# Patient Record
Sex: MALE | Race: WHITE | Employment: OTHER | URBAN - METROPOLITAN AREA
[De-identification: names, ages, dates, MRNs, and addresses within clinical notes are randomized per-mention and may not be internally consistent; named-entity substitution may affect disease eponyms.]

---

## 2021-02-02 ENCOUNTER — HOSPITAL ENCOUNTER (EMERGENCY)
Age: 71
Discharge: ANOTHER HOSPITAL | DRG: 871 | End: 2021-02-02
Attending: EMERGENCY MEDICINE
Payer: COMMERCIAL

## 2021-02-02 ENCOUNTER — HOSPITAL ENCOUNTER (INPATIENT)
Age: 71
LOS: 6 days | Discharge: NURSING, REHAB OR LONG-TERM CARE | DRG: 871 | End: 2021-02-08
Attending: INTERNAL MEDICINE | Admitting: INTERNAL MEDICINE
Payer: COMMERCIAL

## 2021-02-02 ENCOUNTER — APPOINTMENT (OUTPATIENT)
Dept: GENERAL RADIOLOGY | Age: 71
DRG: 871 | End: 2021-02-02
Attending: EMERGENCY MEDICINE
Payer: COMMERCIAL

## 2021-02-02 VITALS
BODY MASS INDEX: 27.09 KG/M2 | SYSTOLIC BLOOD PRESSURE: 150 MMHG | TEMPERATURE: 104.7 F | HEIGHT: 72 IN | HEART RATE: 118 BPM | WEIGHT: 200 LBS | OXYGEN SATURATION: 95 % | RESPIRATION RATE: 20 BRPM | DIASTOLIC BLOOD PRESSURE: 90 MMHG

## 2021-02-02 DIAGNOSIS — J96.01 ACUTE RESPIRATORY FAILURE WITH HYPOXIA (HCC): Primary | ICD-10-CM

## 2021-02-02 DIAGNOSIS — J12.82 PNEUMONIA DUE TO COVID-19 VIRUS: ICD-10-CM

## 2021-02-02 DIAGNOSIS — U07.1 PNEUMONIA DUE TO COVID-19 VIRUS: ICD-10-CM

## 2021-02-02 PROBLEM — E87.20 LACTIC ACIDOSIS: Status: ACTIVE | Noted: 2021-02-02

## 2021-02-02 PROBLEM — A41.9 SEVERE SEPSIS WITH ACUTE ORGAN DYSFUNCTION (HCC): Status: ACTIVE | Noted: 2021-02-02

## 2021-02-02 PROBLEM — J18.9 MULTIFOCAL PNEUMONIA: Status: ACTIVE | Noted: 2021-02-02

## 2021-02-02 PROBLEM — R65.20 SEVERE SEPSIS WITH ACUTE ORGAN DYSFUNCTION (HCC): Status: ACTIVE | Noted: 2021-02-02

## 2021-02-02 LAB
ALBUMIN SERPL-MCNC: 3.6 G/DL (ref 3.2–4.6)
ALBUMIN/GLOB SERPL: 0.8 {RATIO} (ref 1.2–3.5)
ALP SERPL-CCNC: 87 U/L (ref 50–136)
ALT SERPL-CCNC: 72 U/L (ref 12–65)
ANION GAP SERPL CALC-SCNC: 8 MMOL/L (ref 7–16)
AST SERPL-CCNC: 112 U/L (ref 15–37)
BASOPHILS # BLD: 0 K/UL (ref 0–0.2)
BASOPHILS NFR BLD: 0 % (ref 0–2)
BILIRUB SERPL-MCNC: 0.8 MG/DL (ref 0.2–1.1)
BUN SERPL-MCNC: 24 MG/DL (ref 8–23)
CALCIUM SERPL-MCNC: 8.2 MG/DL (ref 8.3–10.4)
CHLORIDE SERPL-SCNC: 108 MMOL/L (ref 98–107)
CO2 SERPL-SCNC: 27 MMOL/L (ref 21–32)
COVID-19 RAPID TEST, COVR: DETECTED
CREAT SERPL-MCNC: 1.49 MG/DL (ref 0.8–1.5)
DIFFERENTIAL METHOD BLD: ABNORMAL
EOSINOPHIL # BLD: 0 K/UL (ref 0–0.8)
EOSINOPHIL NFR BLD: 0 % (ref 0.5–7.8)
ERYTHROCYTE [DISTWIDTH] IN BLOOD BY AUTOMATED COUNT: 12.6 % (ref 11.9–14.6)
GLOBULIN SER CALC-MCNC: 4.4 G/DL (ref 2.3–3.5)
GLUCOSE SERPL-MCNC: 111 MG/DL (ref 65–100)
HCT VFR BLD AUTO: 47.3 % (ref 41.1–50.3)
HGB BLD-MCNC: 15.2 G/DL (ref 13.6–17.2)
IMM GRANULOCYTES # BLD AUTO: 0 K/UL (ref 0–0.5)
IMM GRANULOCYTES NFR BLD AUTO: 1 % (ref 0–5)
LACTATE SERPL-SCNC: 2.6 MMOL/L (ref 0.4–2)
LYMPHOCYTES # BLD: 0.6 K/UL (ref 0.5–4.6)
LYMPHOCYTES NFR BLD: 9 % (ref 13–44)
MCH RBC QN AUTO: 28.8 PG (ref 26.1–32.9)
MCHC RBC AUTO-ENTMCNC: 32.1 G/DL (ref 31.4–35)
MCV RBC AUTO: 89.8 FL (ref 79.6–97.8)
MONOCYTES # BLD: 0.5 K/UL (ref 0.1–1.3)
MONOCYTES NFR BLD: 8 % (ref 4–12)
NEUTS SEG # BLD: 5.3 K/UL (ref 1.7–8.2)
NEUTS SEG NFR BLD: 83 % (ref 43–78)
NRBC # BLD: 0 K/UL (ref 0–0.2)
PLATELET # BLD AUTO: 165 K/UL (ref 150–450)
PMV BLD AUTO: 10.4 FL (ref 9.4–12.3)
POTASSIUM SERPL-SCNC: 3.8 MMOL/L (ref 3.5–5.1)
PROT SERPL-MCNC: 8 G/DL (ref 6.3–8.2)
RBC # BLD AUTO: 5.27 M/UL (ref 4.23–5.6)
SARS-COV-2, COV2: NORMAL
SODIUM SERPL-SCNC: 143 MMOL/L (ref 136–145)
SOURCE, COVRS: ABNORMAL
WBC # BLD AUTO: 6.5 K/UL (ref 4.3–11.1)

## 2021-02-02 PROCEDURE — 74011000258 HC RX REV CODE- 258: Performed by: EMERGENCY MEDICINE

## 2021-02-02 PROCEDURE — 65270000029 HC RM PRIVATE

## 2021-02-02 PROCEDURE — 80053 COMPREHEN METABOLIC PANEL: CPT

## 2021-02-02 PROCEDURE — 74011250636 HC RX REV CODE- 250/636: Performed by: EMERGENCY MEDICINE

## 2021-02-02 PROCEDURE — 96375 TX/PRO/DX INJ NEW DRUG ADDON: CPT

## 2021-02-02 PROCEDURE — 87635 SARS-COV-2 COVID-19 AMP PRB: CPT

## 2021-02-02 PROCEDURE — 96361 HYDRATE IV INFUSION ADD-ON: CPT

## 2021-02-02 PROCEDURE — 87040 BLOOD CULTURE FOR BACTERIA: CPT

## 2021-02-02 PROCEDURE — 71045 X-RAY EXAM CHEST 1 VIEW: CPT

## 2021-02-02 PROCEDURE — 74011250637 HC RX REV CODE- 250/637: Performed by: EMERGENCY MEDICINE

## 2021-02-02 PROCEDURE — 96365 THER/PROPH/DIAG IV INF INIT: CPT

## 2021-02-02 PROCEDURE — 83605 ASSAY OF LACTIC ACID: CPT

## 2021-02-02 PROCEDURE — 99285 EMERGENCY DEPT VISIT HI MDM: CPT

## 2021-02-02 PROCEDURE — 85025 COMPLETE CBC W/AUTO DIFF WBC: CPT

## 2021-02-02 RX ORDER — POLYETHYLENE GLYCOL 3350 17 G/17G
17 POWDER, FOR SOLUTION ORAL DAILY PRN
Status: DISCONTINUED | OUTPATIENT
Start: 2021-02-02 | End: 2021-02-08 | Stop reason: HOSPADM

## 2021-02-02 RX ORDER — ASCORBIC ACID 500 MG
1000 TABLET ORAL 2 TIMES DAILY
Status: DISCONTINUED | OUTPATIENT
Start: 2021-02-03 | End: 2021-02-08 | Stop reason: HOSPADM

## 2021-02-02 RX ORDER — GUAIFENESIN 600 MG/1
600 TABLET, EXTENDED RELEASE ORAL EVERY 12 HOURS
Status: DISCONTINUED | OUTPATIENT
Start: 2021-02-03 | End: 2021-02-06 | Stop reason: ALTCHOICE

## 2021-02-02 RX ORDER — PROMETHAZINE HYDROCHLORIDE 25 MG/1
12.5 TABLET ORAL
Status: DISCONTINUED | OUTPATIENT
Start: 2021-02-02 | End: 2021-02-08 | Stop reason: HOSPADM

## 2021-02-02 RX ORDER — ACETAMINOPHEN 650 MG/1
650 SUPPOSITORY RECTAL
Status: COMPLETED | OUTPATIENT
Start: 2021-02-02 | End: 2021-02-02

## 2021-02-02 RX ORDER — ACETAMINOPHEN 500 MG
1000 TABLET ORAL
Status: COMPLETED | OUTPATIENT
Start: 2021-02-02 | End: 2021-02-02

## 2021-02-02 RX ORDER — ALBUTEROL SULFATE 90 UG/1
1 AEROSOL, METERED RESPIRATORY (INHALATION)
Status: DISCONTINUED | OUTPATIENT
Start: 2021-02-02 | End: 2021-02-08 | Stop reason: HOSPADM

## 2021-02-02 RX ORDER — ENOXAPARIN SODIUM 100 MG/ML
40 INJECTION SUBCUTANEOUS DAILY
Status: DISCONTINUED | OUTPATIENT
Start: 2021-02-03 | End: 2021-02-03

## 2021-02-02 RX ORDER — LISINOPRIL 20 MG/1
20 TABLET ORAL EVERY MORNING
COMMUNITY
End: 2021-02-08

## 2021-02-02 RX ORDER — SODIUM CHLORIDE 0.9 % (FLUSH) 0.9 %
5-40 SYRINGE (ML) INJECTION EVERY 8 HOURS
Status: DISCONTINUED | OUTPATIENT
Start: 2021-02-03 | End: 2021-02-08 | Stop reason: HOSPADM

## 2021-02-02 RX ORDER — SODIUM CHLORIDE 0.9 % (FLUSH) 0.9 %
5-40 SYRINGE (ML) INJECTION AS NEEDED
Status: DISCONTINUED | OUTPATIENT
Start: 2021-02-02 | End: 2021-02-08 | Stop reason: HOSPADM

## 2021-02-02 RX ORDER — AMLODIPINE BESYLATE 10 MG/1
10 TABLET ORAL DAILY
COMMUNITY
Start: 2020-12-28

## 2021-02-02 RX ORDER — ACETAMINOPHEN 325 MG/1
650 TABLET ORAL
Status: DISCONTINUED | OUTPATIENT
Start: 2021-02-02 | End: 2021-02-08 | Stop reason: HOSPADM

## 2021-02-02 RX ORDER — BENZONATATE 100 MG/1
100 CAPSULE ORAL
Status: DISCONTINUED | OUTPATIENT
Start: 2021-02-02 | End: 2021-02-08 | Stop reason: HOSPADM

## 2021-02-02 RX ORDER — SODIUM CHLORIDE 9 MG/ML
250 INJECTION, SOLUTION INTRAVENOUS AS NEEDED
Status: DISCONTINUED | OUTPATIENT
Start: 2021-02-02 | End: 2021-02-08 | Stop reason: HOSPADM

## 2021-02-02 RX ORDER — PANTOPRAZOLE SODIUM 40 MG/1
40 TABLET, DELAYED RELEASE ORAL
Status: DISCONTINUED | OUTPATIENT
Start: 2021-02-03 | End: 2021-02-08 | Stop reason: HOSPADM

## 2021-02-02 RX ORDER — ROSUVASTATIN CALCIUM 20 MG/1
20 TABLET, COATED ORAL DAILY
COMMUNITY
Start: 2020-12-28

## 2021-02-02 RX ORDER — DEXAMETHASONE SODIUM PHOSPHATE 100 MG/10ML
6 INJECTION INTRAMUSCULAR; INTRAVENOUS DAILY
Status: DISCONTINUED | OUTPATIENT
Start: 2021-02-03 | End: 2021-02-08 | Stop reason: HOSPADM

## 2021-02-02 RX ORDER — ONDANSETRON 2 MG/ML
4 INJECTION INTRAMUSCULAR; INTRAVENOUS
Status: DISCONTINUED | OUTPATIENT
Start: 2021-02-02 | End: 2021-02-08 | Stop reason: HOSPADM

## 2021-02-02 RX ORDER — ASPIRIN 81 MG/1
81 TABLET ORAL EVERY MORNING
COMMUNITY

## 2021-02-02 RX ORDER — CHOLECALCIFEROL TAB 125 MCG (5000 UNIT) 125 MCG
5000 TAB ORAL DAILY
Status: DISCONTINUED | OUTPATIENT
Start: 2021-02-03 | End: 2021-02-08 | Stop reason: HOSPADM

## 2021-02-02 RX ORDER — METOPROLOL TARTRATE 50 MG/1
50 TABLET ORAL DAILY
COMMUNITY
End: 2021-02-08

## 2021-02-02 RX ORDER — ACETAMINOPHEN 650 MG/1
650 SUPPOSITORY RECTAL
Status: DISCONTINUED | OUTPATIENT
Start: 2021-02-02 | End: 2021-02-08 | Stop reason: HOSPADM

## 2021-02-02 RX ORDER — ZINC SULFATE 50(220)MG
220 CAPSULE ORAL DAILY
Status: DISCONTINUED | OUTPATIENT
Start: 2021-02-03 | End: 2021-02-08 | Stop reason: HOSPADM

## 2021-02-02 RX ADMIN — VANCOMYCIN HYDROCHLORIDE 2500 MG: 10 INJECTION, POWDER, LYOPHILIZED, FOR SOLUTION INTRAVENOUS at 20:32

## 2021-02-02 RX ADMIN — SODIUM CHLORIDE 1000 ML: 900 INJECTION, SOLUTION INTRAVENOUS at 19:44

## 2021-02-02 RX ADMIN — ACETAMINOPHEN 650 MG: 650 SUPPOSITORY RECTAL at 21:02

## 2021-02-02 RX ADMIN — ACETAMINOPHEN 1000 MG: 500 TABLET ORAL at 19:44

## 2021-02-02 RX ADMIN — PIPERACILLIN SODIUM AND TAZOBACTAM SODIUM 4.5 G: 4; .5 INJECTION, POWDER, LYOPHILIZED, FOR SOLUTION INTRAVENOUS at 19:44

## 2021-02-02 NOTE — ED TRIAGE NOTES
Pt presents by EMS from [facility] due to staff being concerned pt was not breathing and turning red in the face. Initial sats on RA were 92%. Pt was swabbed for Covid today but results are not back yet. Pt c/o headache. . Hx of dementia.

## 2021-02-03 LAB
ABO + RH BLD: NORMAL
ALBUMIN SERPL-MCNC: 2.6 G/DL (ref 3.2–4.6)
ALBUMIN/GLOB SERPL: 0.7 {RATIO} (ref 1.2–3.5)
ALP SERPL-CCNC: 67 U/L (ref 50–136)
ALT SERPL-CCNC: 72 U/L (ref 12–65)
ANION GAP SERPL CALC-SCNC: 8 MMOL/L (ref 7–16)
AST SERPL-CCNC: 115 U/L (ref 15–37)
BILIRUB SERPL-MCNC: 0.7 MG/DL (ref 0.2–1.1)
BNP SERPL-MCNC: 136 PG/ML (ref 5–125)
BUN SERPL-MCNC: 26 MG/DL (ref 8–23)
CALCIUM SERPL-MCNC: 7.6 MG/DL (ref 8.3–10.4)
CHLORIDE SERPL-SCNC: 116 MMOL/L (ref 98–107)
CO2 SERPL-SCNC: 23 MMOL/L (ref 21–32)
CREAT SERPL-MCNC: 1.54 MG/DL (ref 0.8–1.5)
D DIMER PPP FEU-MCNC: 3.94 UG/ML(FEU)
ERYTHROCYTE [DISTWIDTH] IN BLOOD BY AUTOMATED COUNT: 12.6 % (ref 11.9–14.6)
GLOBULIN SER CALC-MCNC: 3.5 G/DL (ref 2.3–3.5)
GLUCOSE BLD STRIP.AUTO-MCNC: 119 MG/DL (ref 65–100)
GLUCOSE BLD STRIP.AUTO-MCNC: 156 MG/DL (ref 65–100)
GLUCOSE SERPL-MCNC: 130 MG/DL (ref 65–100)
HCT VFR BLD AUTO: 40.3 % (ref 41.1–50.3)
HGB BLD-MCNC: 12.8 G/DL (ref 13.6–17.2)
MCH RBC QN AUTO: 28.5 PG (ref 26.1–32.9)
MCHC RBC AUTO-ENTMCNC: 31.8 G/DL (ref 31.4–35)
MCV RBC AUTO: 89.8 FL (ref 79.6–97.8)
NRBC # BLD: 0 K/UL (ref 0–0.2)
PLATELET # BLD AUTO: 126 K/UL (ref 150–450)
PMV BLD AUTO: 10.8 FL (ref 9.4–12.3)
POTASSIUM SERPL-SCNC: 4.1 MMOL/L (ref 3.5–5.1)
PROT SERPL-MCNC: 6.1 G/DL (ref 6.3–8.2)
RBC # BLD AUTO: 4.49 M/UL (ref 4.23–5.6)
SODIUM SERPL-SCNC: 147 MMOL/L (ref 138–145)
WBC # BLD AUTO: 5.7 K/UL (ref 4.3–11.1)

## 2021-02-03 PROCEDURE — 86580 TB INTRADERMAL TEST: CPT | Performed by: INTERNAL MEDICINE

## 2021-02-03 PROCEDURE — 74011250636 HC RX REV CODE- 250/636: Performed by: INTERNAL MEDICINE

## 2021-02-03 PROCEDURE — 97161 PT EVAL LOW COMPLEX 20 MIN: CPT

## 2021-02-03 PROCEDURE — XW033E5 INTRODUCTION OF REMDESIVIR ANTI-INFECTIVE INTO PERIPHERAL VEIN, PERCUTANEOUS APPROACH, NEW TECHNOLOGY GROUP 5: ICD-10-PCS | Performed by: INTERNAL MEDICINE

## 2021-02-03 PROCEDURE — 77030040393 HC DRSG OPTIFOAM GENT MDII -B

## 2021-02-03 PROCEDURE — 80053 COMPREHEN METABOLIC PANEL: CPT

## 2021-02-03 PROCEDURE — XW13325 TRANSFUSION OF CONVALESCENT PLASMA (NONAUTOLOGOUS) INTO PERIPHERAL VEIN, PERCUTANEOUS APPROACH, NEW TECHNOLOGY GROUP 5: ICD-10-PCS | Performed by: INTERNAL MEDICINE

## 2021-02-03 PROCEDURE — 74011000302 HC RX REV CODE- 302: Performed by: INTERNAL MEDICINE

## 2021-02-03 PROCEDURE — 74011000250 HC RX REV CODE- 250: Performed by: INTERNAL MEDICINE

## 2021-02-03 PROCEDURE — 85027 COMPLETE CBC AUTOMATED: CPT

## 2021-02-03 PROCEDURE — 74011250637 HC RX REV CODE- 250/637: Performed by: INTERNAL MEDICINE

## 2021-02-03 PROCEDURE — 97530 THERAPEUTIC ACTIVITIES: CPT

## 2021-02-03 PROCEDURE — 36415 COLL VENOUS BLD VENIPUNCTURE: CPT

## 2021-02-03 PROCEDURE — 85379 FIBRIN DEGRADATION QUANT: CPT

## 2021-02-03 PROCEDURE — 36430 TRANSFUSION BLD/BLD COMPNT: CPT

## 2021-02-03 PROCEDURE — 2709999900 HC NON-CHARGEABLE SUPPLY

## 2021-02-03 PROCEDURE — 74011000258 HC RX REV CODE- 258: Performed by: INTERNAL MEDICINE

## 2021-02-03 PROCEDURE — 86901 BLOOD TYPING SEROLOGIC RH(D): CPT

## 2021-02-03 PROCEDURE — 83880 ASSAY OF NATRIURETIC PEPTIDE: CPT

## 2021-02-03 PROCEDURE — 65270000029 HC RM PRIVATE

## 2021-02-03 PROCEDURE — 82962 GLUCOSE BLOOD TEST: CPT

## 2021-02-03 RX ORDER — ROSUVASTATIN CALCIUM 20 MG/1
20 TABLET, COATED ORAL DAILY
Status: DISCONTINUED | OUTPATIENT
Start: 2021-02-04 | End: 2021-02-08 | Stop reason: HOSPADM

## 2021-02-03 RX ORDER — ENOXAPARIN SODIUM 100 MG/ML
30 INJECTION SUBCUTANEOUS EVERY 12 HOURS
Status: DISCONTINUED | OUTPATIENT
Start: 2021-02-03 | End: 2021-02-08 | Stop reason: HOSPADM

## 2021-02-03 RX ORDER — DEXTROSE, SODIUM CHLORIDE, SODIUM LACTATE, POTASSIUM CHLORIDE, AND CALCIUM CHLORIDE 5; .6; .31; .03; .02 G/100ML; G/100ML; G/100ML; G/100ML; G/100ML
75 INJECTION, SOLUTION INTRAVENOUS CONTINUOUS
Status: DISCONTINUED | OUTPATIENT
Start: 2021-02-03 | End: 2021-02-04

## 2021-02-03 RX ORDER — ASPIRIN 81 MG/1
81 TABLET ORAL EVERY MORNING
Status: DISCONTINUED | OUTPATIENT
Start: 2021-02-04 | End: 2021-02-08 | Stop reason: HOSPADM

## 2021-02-03 RX ADMIN — Medication 10 ML: at 06:05

## 2021-02-03 RX ADMIN — Medication 10 ML: at 13:47

## 2021-02-03 RX ADMIN — GUAIFENESIN 600 MG: 600 TABLET ORAL at 21:42

## 2021-02-03 RX ADMIN — SODIUM CHLORIDE, SODIUM LACTATE, POTASSIUM CHLORIDE, CALCIUM CHLORIDE, AND DEXTROSE MONOHYDRATE 75 ML/HR: 600; 310; 30; 20; 5 INJECTION, SOLUTION INTRAVENOUS at 10:25

## 2021-02-03 RX ADMIN — DEXAMETHASONE SODIUM PHOSPHATE 6 MG: 10 INJECTION INTRAMUSCULAR; INTRAVENOUS at 01:07

## 2021-02-03 RX ADMIN — ENOXAPARIN SODIUM 30 MG: 40 INJECTION SUBCUTANEOUS at 21:42

## 2021-02-03 RX ADMIN — GUAIFENESIN 600 MG: 600 TABLET ORAL at 09:14

## 2021-02-03 RX ADMIN — TUBERCULIN PURIFIED PROTEIN DERIVATIVE 5 UNITS: 5 INJECTION INTRADERMAL at 14:00

## 2021-02-03 RX ADMIN — CHOLECALCIFEROL TAB 125 MCG (5000 UNIT) 5000 UNITS: 125 TAB at 09:14

## 2021-02-03 RX ADMIN — ENOXAPARIN SODIUM 30 MG: 40 INJECTION SUBCUTANEOUS at 09:14

## 2021-02-03 RX ADMIN — ZINC SULFATE 220 MG (50 MG) CAPSULE 220 MG: CAPSULE at 09:14

## 2021-02-03 RX ADMIN — OXYCODONE HYDROCHLORIDE AND ACETAMINOPHEN 1000 MG: 500 TABLET ORAL at 17:13

## 2021-02-03 RX ADMIN — Medication 10 ML: at 01:08

## 2021-02-03 RX ADMIN — OXYCODONE HYDROCHLORIDE AND ACETAMINOPHEN 1000 MG: 500 TABLET ORAL at 09:14

## 2021-02-03 RX ADMIN — Medication 10 ML: at 21:42

## 2021-02-03 RX ADMIN — PANTOPRAZOLE SODIUM 40 MG: 40 TABLET, DELAYED RELEASE ORAL at 09:14

## 2021-02-03 RX ADMIN — REMDESIVIR 200 MG: 100 INJECTION, POWDER, LYOPHILIZED, FOR SOLUTION INTRAVENOUS at 06:04

## 2021-02-03 RX ADMIN — GUAIFENESIN 600 MG: 600 TABLET ORAL at 01:07

## 2021-02-03 NOTE — PROGRESS NOTES
TRANSFER - IN REPORT:    Verbal report received from [facility] on Dillon Reina  being received from [place] ED for routine progression of care      Report consisted of patients Situation, Background, Assessment and   Recommendations(SBAR). Information from the following report(s) SBAR, Kardex, ED Summary, Intake/Output and MAR was reviewed with the receiving nurse. Opportunity for questions and clarification was provided. Assessment completed upon patients arrival to unit and care assumed.

## 2021-02-03 NOTE — ED PROVIDER NOTES
The history is provided by the patient and the EMS personnel. The history is limited by the condition of the patient.   Shortness of Breath  This is a new problem. The average episode lasts 1 day. The problem occurs intermittently.The current episode started 1 to 2 hours ago. The problem has been resolved. Pertinent negatives include no fever. It is unknown what precipitated the problem. He has tried nothing for the symptoms. The treatment provided mild relief. He has had no prior hospitalizations. Associated medical issues do not include asthma, COPD, pneumonia, chronic lung disease, PE, CAD, heart failure or DVT.        No past medical history on file.    No past surgical history on file.      No family history on file.    Social History     Socioeconomic History   • Marital status:      Spouse name: Not on file   • Number of children: Not on file   • Years of education: Not on file   • Highest education level: Not on file   Occupational History   • Not on file   Social Needs   • Financial resource strain: Not on file   • Food insecurity     Worry: Not on file     Inability: Not on file   • Transportation needs     Medical: Not on file     Non-medical: Not on file   Tobacco Use   • Smoking status: Not on file   Substance and Sexual Activity   • Alcohol use: Not on file   • Drug use: Not on file   • Sexual activity: Not on file   Lifestyle   • Physical activity     Days per week: Not on file     Minutes per session: Not on file   • Stress: Not on file   Relationships   • Social connections     Talks on phone: Not on file     Gets together: Not on file     Attends Hoahaoism service: Not on file     Active member of club or organization: Not on file     Attends meetings of clubs or organizations: Not on file     Relationship status: Not on file   • Intimate partner violence     Fear of current or ex partner: Not on file     Emotionally abused: Not on file     Physically abused: Not on file     Forced sexual  activity: Not on file   Other Topics Concern    Not on file   Social History Narrative    Not on file         ALLERGIES: Patient has no known allergies. Review of Systems   Unable to perform ROS: Dementia   Constitutional: Negative for fever. Respiratory: Positive for shortness of breath. Vitals:    02/02/21 1852   BP: (!) 151/96   Pulse: (!) 113   Resp: 20   Temp: (!) 103.1 °F (39.5 °C)   SpO2: (!) 88%            Physical Exam  Vitals signs and nursing note reviewed. Constitutional:       General: He is not in acute distress. Appearance: He is well-developed. HENT:      Head: Normocephalic and atraumatic. Right Ear: Tympanic membrane and external ear normal.      Left Ear: Tympanic membrane and external ear normal.      Mouth/Throat:      Mouth: Mucous membranes are moist.   Eyes:      Extraocular Movements: Extraocular movements intact. Conjunctiva/sclera: Conjunctivae normal.      Pupils: Pupils are equal, round, and reactive to light. Neck:      Musculoskeletal: Normal range of motion and neck supple. Cardiovascular:      Rate and Rhythm: Normal rate and regular rhythm. Heart sounds: Normal heart sounds. No murmur. Pulmonary:      Effort: Pulmonary effort is normal.      Breath sounds: Rhonchi (Bilateral bases) present. No wheezing or rales. Abdominal:      General: Bowel sounds are normal.      Palpations: Abdomen is soft. Tenderness: There is no abdominal tenderness. There is no right CVA tenderness or left CVA tenderness. Musculoskeletal: Normal range of motion. Skin:     General: Skin is warm and dry. Capillary Refill: Capillary refill takes less than 2 seconds. Neurological:      Mental Status: He is alert. He is disoriented. Sensory: Sensation is intact. Motor: Motor function is intact. Psychiatric:         Mood and Affect: Mood normal.         Speech: Speech normal.         Cognition and Memory: Cognition is impaired.  Memory is impaired. MDM  Number of Diagnoses or Management Options  Acute respiratory failure with hypoxia ([facility] [address]): new and requires workup  Pneumonia due to COVID-19 virus: new and requires workup     Amount and/or Complexity of Data Reviewed  Clinical lab tests: reviewed and ordered  Tests in the radiology section of CPT®: reviewed and ordered  Review and summarize past medical records: yes  Independent visualization of images, tracings, or specimens: yes    Risk of Complications, Morbidity, and/or Mortality  Presenting problems: high  Diagnostic procedures: moderate  Management options: moderate    Patient Progress  Patient progress: stable         Procedures    The patient was observed in the ED. patient presented hypoxemic with a room air O2 sat of 88%, required 4 L of oxygen to get up around 93 to 94%. Fever was treated with Tylenol and the patient was given IV fluids as well as antibiotics for suspected sepsis. Case was discussed with the hospitalist downwn who will admit. Results Reviewed:      Recent Results (from the past 24 hour(s))   CBC WITH AUTOMATED DIFF    Collection Time: 02/02/21  7:14 PM   Result Value Ref Range    WBC 6.5 4.3 - 11.1 K/uL    RBC 5.27 4.23 - 5.6 M/uL    HGB 15.2 13.6 - 17.2 g/dL    HCT 47.3 41.1 - 50.3 %    MCV 89.8 79.6 - 97.8 FL    MCH 28.8 26.1 - 32.9 PG    MCHC 32.1 31.4 - 35.0 g/dL    RDW 12.6 11.9 - 14.6 %    PLATELET 684 191 - 577 K/uL    MPV 10.4 9.4 - 12.3 FL    ABSOLUTE NRBC 0.00 0.0 - 0.2 K/uL    DF AUTOMATED      NEUTROPHILS 83 (H) 43 - 78 %    LYMPHOCYTES 9 (L) 13 - 44 %    MONOCYTES 8 4.0 - 12.0 %    EOSINOPHILS 0 (L) 0.5 - 7.8 %    BASOPHILS 0 0.0 - 2.0 %    IMMATURE GRANULOCYTES 1 0.0 - 5.0 %    ABS. NEUTROPHILS 5.3 1.7 - 8.2 K/UL    ABS. LYMPHOCYTES 0.6 0.5 - 4.6 K/UL    ABS. MONOCYTES 0.5 0.1 - 1.3 K/UL    ABS. EOSINOPHILS 0.0 0.0 - 0.8 K/UL    ABS. BASOPHILS 0.0 0.0 - 0.2 K/UL    ABS. IMM.  GRANS. 0.0 0.0 - 0.5 K/UL   METABOLIC PANEL, COMPREHENSIVE Collection Time: 02/02/21  7:14 PM   Result Value Ref Range    Sodium 143 136 - 145 mmol/L    Potassium 3.8 3.5 - 5.1 mmol/L    Chloride 108 (H) 98 - 107 mmol/L    CO2 27 21 - 32 mmol/L    Anion gap 8 7 - 16 mmol/L    Glucose 111 (H) 65 - 100 mg/dL    BUN 24 (H) 8 - 23 MG/DL    Creatinine 1.49 0.8 - 1.5 MG/DL    GFR est AA 60 (L) >60 ml/min/1.73m2    GFR est non-AA 49 (L) >60 ml/min/1.73m2    Calcium 8.2 (L) 8.3 - 10.4 MG/DL    Bilirubin, total 0.8 0.2 - 1.1 MG/DL    ALT (SGPT) 72 (H) 12 - 65 U/L    AST (SGOT) 112 (H) 15 - 37 U/L    Alk. phosphatase 87 50 - 136 U/L    Protein, total 8.0 6.3 - 8.2 g/dL    Albumin 3.6 3.2 - 4.6 g/dL    Globulin 4.4 (H) 2.3 - 3.5 g/dL    A-G Ratio 0.8 (L) 1.2 - 3.5     LACTIC ACID    Collection Time: 02/02/21  7:14 PM   Result Value Ref Range    Lactic acid 2.6 (H) 0.4 - 2.0 MMOL/L   SARS-COV-2    Collection Time: 02/02/21  7:51 PM   Result Value Ref Range    SARS-CoV-2 Please find results under separate order     COVID-19 RAPID TEST    Collection Time: 02/02/21  7:51 PM   Result Value Ref Range    Specimen source Nasopharyngeal      COVID-19 rapid test Detected (AA) NOTD       XR CHEST SNGL V   Final Result   1. Multifocal bilateral lung infiltrates, nonspecific but concerning for viral   pneumonia in the appropriate Clinical setting. 2. No dense lobar consolidation. CRITICAL CARE Documentation: This patient is critically ill and there is a high probability of of imminent or life threatening deterioration in the patient's condition without immediate management. The nature of the patient's clinical problem is: Acute respiratory failure with hypoxia, pneumonia secondary to COVID-19, severe sepsis    I have spent 1 hour in direct patient care, documentation, review of labs/xrays/old records, discussion with Staff, Colleague, Nursing . The time involved in the performance of separately reportable procedures was not counted toward critical care time.      Leroy SPAIN Radha Olmstead MD; 2/2/2021 @8:57 PM

## 2021-02-03 NOTE — PROGRESS NOTES
Problem: Falls - Risk of  Goal: *Absence of Falls  Description: Document Francisco De Leon Fall Risk and appropriate interventions in the flowsheet. Outcome: Progressing Towards Goal  Note: Fall Risk Interventions:  Mobility Interventions: Communicate number of staff needed for ambulation/transfer, Patient to call before getting OOB    Mentation Interventions: Adequate sleep, hydration, pain control, More frequent rounding, Reorient patient         Elimination Interventions: Call light in reach, Patient to call for help with toileting needs, Toileting schedule/hourly rounds              Problem: Patient Education: Go to Patient Education Activity  Goal: Patient/Family Education  Outcome: Progressing Towards Goal     Problem: Pressure Injury - Risk of  Goal: *Prevention of pressure injury  Description: Document Dave Scale and appropriate interventions in the flowsheet. Outcome: Progressing Towards Goal  Note: Pressure Injury Interventions:  Sensory Interventions: Assess changes in LOC, Avoid rigorous massage over bony prominences, Keep linens dry and wrinkle-free, Minimize linen layers, Pressure redistribution bed/mattress (bed type), Turn and reposition approx. every two hours (pillows and wedges if needed)    Moisture Interventions: Absorbent underpads, Limit adult briefs    Activity Interventions: Increase time out of bed, Pressure redistribution bed/mattress(bed type), PT/OT evaluation    Mobility Interventions: Pressure redistribution bed/mattress (bed type), PT/OT evaluation, Turn and reposition approx.  every two hours(pillow and wedges)    Nutrition Interventions: Document food/fluid/supplement intake, Offer support with meals,snacks and hydration    Friction and Shear Interventions: Minimize layers                Problem: Patient Education: Go to Patient Education Activity  Goal: Patient/Family Education  Outcome: Progressing Towards Goal

## 2021-02-03 NOTE — PROGRESS NOTES
Blood consent obtained and plasma transfusing, VSS, 4lnc, afebrile. Pt loc unchanged, hr is bradycardia with no adverse symptoms visualized. Pt is baseline dementia, oriented only to person per wife this am. No pain noted, rn has attempted set up and feed meal, but pt intake is poor. IVF initiated this am and bg order noted and wnl. Bed alarm in place, freq visual checks. PPD placed for d/c planning, incontinent of b/b, brief checks and skin integrity ongoing.

## 2021-02-03 NOTE — ED NOTES
Pt in room shaking, rectal temp checked and pt was found soaking wet through his cloths. Pt clean and changed and currently dry.  Rectal tylenol given

## 2021-02-03 NOTE — ED NOTES
TRANSFER - OUT REPORT:    Verbal report given to Tamiko Lambert RN on Alisa Haney  being transferred to [place] [address] for routine progression of care       Report consisted of patients Situation, Background, Assessment and   Recommendations(SBAR). Information from the following report(s) ED Summary, MAR and Recent Results was reviewed with the receiving nurse. Lines:   Peripheral IV 02/02/21 Left Antecubital (Active)   Site Assessment Clean, dry, & intact 02/02/21 1916   Phlebitis Assessment 0 02/02/21 1916   Infiltration Assessment 0 02/02/21 1916   Dressing Status Clean, dry, & intact 02/02/21 1916   Hub Color/Line Status Pink 02/02/21 1916        Opportunity for questions and clarification was provided.       Patient transported with:   O2 @ 3 liters  Tech

## 2021-02-03 NOTE — PROGRESS NOTES
Progress Note    Patient: Phuong Mcdaniel MRN: [id number]  SSN: [id number]    YOB: 1950  Age: 69 y.o. Sex: male      Admit Date: 2/2/2021    LOS: 1 day     Subjective:   69 y.o. male with medical history significant for HTN, HLD, dementia who presented from memory care unit due to hypoxia. Patient seen and examined at bedside. This morning the patient is awake but unable to obtain any history due to his advanced dementia. Does not look on distress. Objective:     Vitals:    02/03/21 1341 02/03/21 1355 02/03/21 1430 02/03/21 1500   BP: 117/77 126/74 131/74 128/77   Pulse: (!) 51 (!) 50 (!) 56 (!) 56   Resp: 17 18 18 17   Temp: 97.3 °F (36.3 °C) 98.3 °F (36.8 °C)     SpO2:  99%  99%   Weight:       Height:            Intake and Output:  Current Shift: 02/03 0701 - 02/03 1900  In: 114 [P.O.:600; I.V.:225]  Out: -   Last three shifts: No intake/output data recorded.     ROS  Unable to obtain due to patient's dementia    Physical Exam:   General: Alert, NAD  HEENT: NC/AT, EOM are intact  Neck: supple, no JVD  Cardiovascular: RRR, S1, S2, no murmurs  Respiratory: Decreased breath sounds, no wheezes  Abdomen: Soft, NT, ND  Back: No CVA tenderness, no paraspinal tenderness  Extremities: LE without pedal edema, no erythema  Neuro: CN are intact, no focal deficits  Skin: no rash or ulcers    Lab/Data Review:  I have personally reviewed patients laboratory data showing  Recent Results (from the past 24 hour(s))   CBC WITH AUTOMATED DIFF    Collection Time: 02/02/21  7:14 PM   Result Value Ref Range    WBC 6.5 4.3 - 11.1 K/uL    RBC 5.27 4.23 - 5.6 M/uL    HGB 15.2 13.6 - 17.2 g/dL    HCT 47.3 41.1 - 50.3 %    MCV 89.8 79.6 - 97.8 FL    MCH 28.8 26.1 - 32.9 PG    MCHC 32.1 31.4 - 35.0 g/dL    RDW 12.6 11.9 - 14.6 %    PLATELET 951 921 - 295 K/uL    MPV 10.4 9.4 - 12.3 FL    ABSOLUTE NRBC 0.00 0.0 - 0.2 K/uL    DF AUTOMATED      NEUTROPHILS 83 (H) 43 - 78 %    LYMPHOCYTES 9 (L) 13 - 44 %    MONOCYTES 8 4.0 - 12.0 %    EOSINOPHILS 0 (L) 0.5 - 7.8 %    BASOPHILS 0 0.0 - 2.0 %    IMMATURE GRANULOCYTES 1 0.0 - 5.0 %    ABS. NEUTROPHILS 5.3 1.7 - 8.2 K/UL    ABS. LYMPHOCYTES 0.6 0.5 - 4.6 K/UL    ABS. MONOCYTES 0.5 0.1 - 1.3 K/UL    ABS. EOSINOPHILS 0.0 0.0 - 0.8 K/UL    ABS. BASOPHILS 0.0 0.0 - 0.2 K/UL    ABS. IMM. GRANS. 0.0 0.0 - 0.5 K/UL   METABOLIC PANEL, COMPREHENSIVE    Collection Time: 02/02/21  7:14 PM   Result Value Ref Range    Sodium 143 136 - 145 mmol/L    Potassium 3.8 3.5 - 5.1 mmol/L    Chloride 108 (H) 98 - 107 mmol/L    CO2 27 21 - 32 mmol/L    Anion gap 8 7 - 16 mmol/L    Glucose 111 (H) 65 - 100 mg/dL    BUN 24 (H) 8 - 23 MG/DL    Creatinine 1.49 0.8 - 1.5 MG/DL    GFR est AA 60 (L) >60 ml/min/1.73m2    GFR est non-AA 49 (L) >60 ml/min/1.73m2    Calcium 8.2 (L) 8.3 - 10.4 MG/DL    Bilirubin, total 0.8 0.2 - 1.1 MG/DL    ALT (SGPT) 72 (H) 12 - 65 U/L    AST (SGOT) 112 (H) 15 - 37 U/L    Alk.  phosphatase 87 50 - 136 U/L    Protein, total 8.0 6.3 - 8.2 g/dL    Albumin 3.6 3.2 - 4.6 g/dL    Globulin 4.4 (H) 2.3 - 3.5 g/dL    A-G Ratio 0.8 (L) 1.2 - 3.5     LACTIC ACID    Collection Time: 02/02/21  7:14 PM   Result Value Ref Range    Lactic acid 2.6 (H) 0.4 - 2.0 MMOL/L   CULTURE, BLOOD    Collection Time: 02/02/21  7:14 PM    Specimen: Blood   Result Value Ref Range    Special Requests: LEFT  Antecubital        Culture result: NO GROWTH AFTER 13 HOURS     SARS-COV-2    Collection Time: 02/02/21  7:51 PM   Result Value Ref Range    SARS-CoV-2 Please find results under separate order     COVID-19 RAPID TEST    Collection Time: 02/02/21  7:51 PM   Result Value Ref Range    Specimen source Nasopharyngeal      COVID-19 rapid test Detected (AA) NOTD     CULTURE, BLOOD    Collection Time: 02/02/21  8:28 PM    Specimen: Blood   Result Value Ref Range    Special Requests: RIGHT  Antecubital        Culture result: NO GROWTH AFTER 12 HOURS     CONVALESCENT PLASMA, ALLOCATE    Collection Time: 02/02/21 11:30 PM   Result Value Ref Range    Unit number [id number]     Blood component type ConvPls,Th     Unit division C0     Status of unit ISSUED    CBC W/O DIFF    Collection Time: 02/03/21  4:42 AM   Result Value Ref Range    WBC 5.7 4.3 - 11.1 K/uL    RBC 4.49 4.23 - 5.6 M/uL    HGB 12.8 (L) 13.6 - 17.2 g/dL    HCT 40.3 (L) 41.1 - 50.3 %    MCV 89.8 79.6 - 97.8 FL    MCH 28.5 26.1 - 32.9 PG    MCHC 31.8 31.4 - 35.0 g/dL    RDW 12.6 11.9 - 14.6 %    PLATELET 126 (L) 150 - 450 K/uL    MPV 10.8 9.4 - 12.3 FL    ABSOLUTE NRBC 0.00 0.0 - 0.2 K/uL   D DIMER    Collection Time: 02/03/21  4:42 AM   Result Value Ref Range    D DIMER 3.94 (H) <0.56 ug/ml(FEU)   METABOLIC PANEL, COMPREHENSIVE    Collection Time: 02/03/21  4:43 AM   Result Value Ref Range    Sodium 147 (H) 138 - 145 mmol/L    Potassium 4.1 3.5 - 5.1 mmol/L    Chloride 116 (H) 98 - 107 mmol/L    CO2 23 21 - 32 mmol/L    Anion gap 8 7 - 16 mmol/L    Glucose 130 (H) 65 - 100 mg/dL    BUN 26 (H) 8 - 23 MG/DL    Creatinine 1.54 (H) 0.8 - 1.5 MG/DL    GFR est AA 58 (L) >60 ml/min/1.73m2    GFR est non-AA 48 (L) >60 ml/min/1.73m2    Calcium 7.6 (L) 8.3 - 10.4 MG/DL    Bilirubin, total 0.7 0.2 - 1.1 MG/DL    ALT (SGPT) 72 (H) 12 - 65 U/L    AST (SGOT) 115 (H) 15 - 37 U/L    Alk. phosphatase 67 50 - 136 U/L    Protein, total 6.1 (L) 6.3 - 8.2 g/dL    Albumin 2.6 (L) 3.2 - 4.6 g/dL    Globulin 3.5 2.3 - 3.5 g/dL    A-G Ratio 0.7 (L) 1.2 - 3.5     NT-PRO BNP    Collection Time: 02/03/21  4:43 AM   Result Value Ref Range    NT pro- (H) 5 - 125 PG/ML   BLOOD TYPE, (ABO+RH)    Collection Time: 02/03/21  7:27 AM   Result Value Ref Range    ABO/Rh(D) O NEGATIVE    GLUCOSE, POC    Collection Time: 02/03/21  9:36 AM   Result Value Ref Range    Glucose (POC) 119 (H) 65 - 100 mg/dL        Image:  I have personally reviewed patients imaging showing  No orders to display        Hospital problems     Principal Problem:    Severe sepsis with acute organ dysfunction (HCC) (2/2/2021)    Active  Problems:    Lactic acidosis (2/2/2021)      Acute respiratory failure with hypoxia (HCC) (2/2/2021)      Multifocal pneumonia (2/2/2021)      COVID-19 virus infection (2/2/2021)        Assessment and Plan:   69 y.o. male with medical history significant for HTN, HLD, dementia who presented from memory care unit due to hypoxia    1. Acute hypoxic respiratory failure in the setting of COVID-19 pneumonia  -Oxygen therapy to maintain oxygen saturation more than 92%  -Continue Decadron, vitamin C, vitamin D, zinc  -Continue remdesivir  -Transfuse convalescent plasma  -Albuterol as needed  -Symptomatic management  -Incentive spirometer    2. Hypertension  -Hold metoprolol, lisinopril, amlodipine since currently normotensive    3. Hyperlipidemia  -Continue statin    4. Dementia  -Monitor mental status  -Avoid sedatives  -Delirium precautions    DVT prophylaxis with Lovenox    I have reviewed, updated, and verified this note's content and spent 38 minutes of my 42 minutes visit performing counseling and coordination of care regarding medical management.       Signed By: Vitaliy Nguyen MD     February 3, 2021

## 2021-02-03 NOTE — PROGRESS NOTES
02/02/21 2210   Dual Skin Pressure Injury Assessment   Dual Skin Pressure Injury Assessment WDL   Second Care Provider (Based on [address] [place] [address]) Ayaan Knight RN   Skin Integumentary   Skin Integumentary (WDL) X    Pressure  Injury Documentation No Pressure Injury Noted-Pressure Ulcer Prevention Initiated   Skin Color Red; Appropriate for ethnicity  (Back)   Skin Condition/Temp Warm;Dry;Flaky;Fragile   Skin Integrity Intact

## 2021-02-03 NOTE — PROGRESS NOTES
Pt was transferred from [facility] ED on 2-2-2021. CM spoke with pt's spouse Thomas Ruff [phone] to discuss d/c planning. Pt resides at [place] [address] in the memory care unit. He has resided there for approximately 17 days. Pt does not have any home DME. Pt requires assistance with all ADLs with the exception of feeding himself. Pt is A/O x1 and has a Dx of Dementia without Behavioral Disturbance. He no longer sees Dr. Armen Rogers and is now treated by the [facility]'s physician and NP. Pt has insurance with pharmacy benefits. Pt is currently requiring 4L O2 NC. Pt is receiving Remdesivir (day 1/5). PT and OT evals have been ordered. D/C plan is uncertain until pt is evaluated by PT and OT to help determine if pt is safe to return to the [facility] LOC. CM will continue to follow and remain available if any needs arise.     Care Management Interventions  PCP Verified by CM: Yes([facility] physician and NP)  Mode of Transport at Discharge: BLS([facility])  Transition of Care Consult (CM Consult): Discharge Planning  Physical Therapy Consult: Yes  Occupational Therapy Consult: Yes  Current Support Network: Assisted Living, Family Lives Nearby([facility] - Memory Care Unit)  Confirm Follow Up Transport: Other (see comment)([facility])  The Patient and/or Patient Representative was Provided with a Choice of Provider and Agrees with the Discharge Plan?: Yes  Freedom of Choice List was Provided with Basic Dialogue that Supports the Patient's Individualized Plan of Care/Goals, Treatment Preferences and Shares the Quality Data Associated with the Providers?: Yes  [place] Resource Information Provided?: No  Discharge Location  Discharge Placement: Unable to determine at this time

## 2021-02-03 NOTE — H&P
Hospitalist H&P Note       Admit Date:  2021 10:07 PM   Name:  Rambo Aguilar  Age: 69 y.o.  : 1950   MRN:  [id number]   PCP:  Chan Johnson MD      Reason for Admission:  Acute respiratory failure with hypoxia ([facility] [J96.01]  COVID-19 virus infection [U07.1]  Multifocal pneumonia [J18.9]  Severe sepsis with acute organ dysfunction ([facility] [A41.9, R65.20]  Lactic acidosis [E87.2]      History of Presenting Illness:   Rambo Aguilar is a 69 y.o. male with medical history significant for HTN, HLD, dementia who presented from memory care unit due. History was limited due to condition of the patient. Patient is AAO x1 and unable to tell me why he is brought to the ED. Patient currently denies any chest pains, shortness of breath, fever, chills, abdominal pain, nausea, vomiting, diarrhea. Per wife patient was recently admitted to memory care unit for his dementia. Wife was told by memory care unit staffs that he was noted to be hypoxic on room air associated with elevated BP and HR. In the [facility] ED,  He was noted to be hypoxic on room air at 88%, febrile to 103.1F, tachycardic and tachypenic. Labs were notable for BUN 24, creatinine 1.49, ALT 72, , lactic acid 2.6. CXR with bilateral lung infiltrates. Rapid COVID test is positive and therefore patient was sent to [facility] for admission. Review of Systems:10 systems reviewed and negative except as noted in HPI. Past Medical History:   Diagnosis Date    Dementia without behavioral disturbance ([facility]     Hyperlipidemia     Hypertension       History reviewed. No pertinent surgical history.    No Known Allergies   Social History     Tobacco Use    Smoking status: Never Smoker    Smokeless tobacco: Never Used   Substance Use Topics    Alcohol use: Not Currently      Family History   Problem Relation Age of Onset    No Known Problems Mother     No Known Problems Father           PTA Medications:  Prior to Admission Medications   Prescriptions Last Dose Informant Patient Reported? Taking? amLODIPine (NORVASC) 10 mg tablet   Yes No   Sig: Take 10 mg by mouth daily. aspirin delayed-release 81 mg tablet   Yes No   Sig: Take 81 mg by mouth Every morning. lisinopriL (PRINIVIL, ZESTRIL) 20 mg tablet   Yes No   Sig: Take 20 mg by mouth Every morning. metoprolol tartrate (LOPRESSOR) 50 mg tablet   Yes No   Sig: Take 50 mg by mouth daily. rosuvastatin (CRESTOR) 20 mg tablet   Yes No   Sig: Take 20 mg by mouth daily. Facility-Administered Medications: None         Objective:    Patient Vitals for the past 24 hrs:   Temp Pulse Resp BP SpO2   02/02/21 2210 100 °F (37.8 °C) 96 20 119/89 95 %     Oxygen Therapy  O2 Sat (%): 95 % (02/02/21 2210)    Body mass index is 27.12 kg/m². Physical Exam:  General:     alert, awake, no acute distress. Head:   normocephalic, atraumatic  Eyes, Ears, nose: PERRL, EOMI. Normal Conjunctiva. Neck:    supple, non-tender. Trachea midline. Lungs:   Scattered crackles, no wheezing  Cardiac:   RRR, Normal S1 and S2. No S3, S4 or murmurs. Abdomen:   Soft, non distended, nontender, +BS  Extremities:   Warm, dry. No edema   Skin:   No rashes, no jaundice  Neuro:  AAOx 1.  No gross focal neurological deficit  Psychiatric:  No anxiety, calm, cooperative      Data Review:   Recent Results (from the past 24 hour(s))   CBC WITH AUTOMATED DIFF    Collection Time: 02/02/21  7:14 PM   Result Value Ref Range    WBC 6.5 4.3 - 11.1 K/uL    RBC 5.27 4.23 - 5.6 M/uL    HGB 15.2 13.6 - 17.2 g/dL    HCT 47.3 41.1 - 50.3 %    MCV 89.8 79.6 - 97.8 FL    MCH 28.8 26.1 - 32.9 PG    MCHC 32.1 31.4 - 35.0 g/dL    RDW 12.6 11.9 - 14.6 %    PLATELET 097 141 - 291 K/uL    MPV 10.4 9.4 - 12.3 FL    ABSOLUTE NRBC 0.00 0.0 - 0.2 K/uL    DF AUTOMATED      NEUTROPHILS 83 (H) 43 - 78 %    LYMPHOCYTES 9 (L) 13 - 44 %    MONOCYTES 8 4.0 - 12.0 %    EOSINOPHILS 0 (L) 0.5 - 7.8 %    BASOPHILS 0 0.0 - 2.0 %    IMMATURE GRANULOCYTES 1 0.0 - 5.0 %    ABS. NEUTROPHILS 5.3 1.7 - 8.2 K/UL    ABS. LYMPHOCYTES 0.6 0.5 - 4.6 K/UL    ABS. MONOCYTES 0.5 0.1 - 1.3 K/UL    ABS. EOSINOPHILS 0.0 0.0 - 0.8 K/UL    ABS. BASOPHILS 0.0 0.0 - 0.2 K/UL    ABS. IMM. GRANS. 0.0 0.0 - 0.5 K/UL   METABOLIC PANEL, COMPREHENSIVE    Collection Time: 02/02/21  7:14 PM   Result Value Ref Range    Sodium 143 136 - 145 mmol/L    Potassium 3.8 3.5 - 5.1 mmol/L    Chloride 108 (H) 98 - 107 mmol/L    CO2 27 21 - 32 mmol/L    Anion gap 8 7 - 16 mmol/L    Glucose 111 (H) 65 - 100 mg/dL    BUN 24 (H) 8 - 23 MG/DL    Creatinine 1.49 0.8 - 1.5 MG/DL    GFR est AA 60 (L) >60 ml/min/1.73m2    GFR est non-AA 49 (L) >60 ml/min/1.73m2    Calcium 8.2 (L) 8.3 - 10.4 MG/DL    Bilirubin, total 0.8 0.2 - 1.1 MG/DL    ALT (SGPT) 72 (H) 12 - 65 U/L    AST (SGOT) 112 (H) 15 - 37 U/L    Alk.  phosphatase 87 50 - 136 U/L    Protein, total 8.0 6.3 - 8.2 g/dL    Albumin 3.6 3.2 - 4.6 g/dL    Globulin 4.4 (H) 2.3 - 3.5 g/dL    A-G Ratio 0.8 (L) 1.2 - 3.5     LACTIC ACID    Collection Time: 02/02/21  7:14 PM   Result Value Ref Range    Lactic acid 2.6 (H) 0.4 - 2.0 MMOL/L   SARS-COV-2    Collection Time: 02/02/21  7:51 PM   Result Value Ref Range    SARS-CoV-2 Please find results under separate order     COVID-19 RAPID TEST    Collection Time: 02/02/21  7:51 PM   Result Value Ref Range    Specimen source Nasopharyngeal      COVID-19 rapid test Detected (AA) NOTD         All Micro Results     None          Current Facility-Administered Medications   Medication Dose Route Frequency    sodium chloride (NS) flush 5-40 mL  5-40 mL IntraVENous Q8H    sodium chloride (NS) flush 5-40 mL  5-40 mL IntraVENous PRN    acetaminophen (TYLENOL) tablet 650 mg  650 mg Oral Q6H PRN    Or    acetaminophen (TYLENOL) suppository 650 mg  650 mg Rectal Q6H PRN    polyethylene glycol (MIRALAX) packet 17 g  17 g Oral DAILY PRN    promethazine (PHENERGAN) tablet 12.5 mg  12.5 mg Oral Q6H PRN    Or    ondansetron (ZOFRAN) injection 4 mg  4 mg IntraVENous Q6H PRN    enoxaparin (LOVENOX) injection 40 mg  40 mg SubCUTAneous DAILY    pantoprazole (PROTONIX) tablet 40 mg  40 mg Oral ACB    dexamethasone (DECADRON) 10 mg/mL injection 6 mg  6 mg IntraVENous DAILY    ascorbic acid (vitamin C) (VITAMIN C) tablet 1,000 mg  1,000 mg Oral BID    zinc sulfate (ZINCATE) 220 (50) mg capsule 220 mg  220 mg Oral DAILY    albuterol (PROVENTIL HFA, VENTOLIN HFA, PROAIR HFA) inhaler 1 Puff  1 Puff Inhalation Q4H PRN    cholecalciferol (VITAMIN D3) tablet 5,000 Units  5,000 Units Oral DAILY    guaiFENesin ER (MUCINEX) tablet 600 mg  600 mg Oral Q12H    benzonatate (TESSALON) capsule 100 mg  100 mg Oral TID PRN    0.9% sodium chloride infusion 250 mL  250 mL IntraVENous PRN    remdesivir 200 mg in 0.9% sodium chloride 250 mL IVPB  200 mg IntraVENous ONCE    Followed by   Samson Snider ON 2/4/2021] remdesivir 100 mg in 0.9% sodium chloride 250 mL IVPB  100 mg IntraVENous Q24H       Other Studies:  Xr Chest Sngl V    Result Date: 2/2/2021  Chest portable CLINICAL INDICATION: Acute respiratory distress, headache, facial skin color changes COMPARISON: None TECHNIQUE: single AP portable view chest at 7:08 PM upright FINDINGS: Lungs are well-inflated. No dense consolidation, pneumothorax, CHF or significant pleural effusion. Mild groundglass and reticular infiltrate scattered in the periphery of both lower lobes and the right upper lobe. Borderline enlargement of cardiac silhouette. Normal hilar contours. Patient is slightly rotated. Aorta is tortuous, not unusual for age. 1. Multifocal bilateral lung infiltrates, nonspecific but concerning for viral pneumonia in the appropriate Clinical setting. 2. No dense lobar consolidation.          Assessment:  Hospital Problems as of 2/3/2021 Never Reviewed          Codes Class Noted - Resolved POA    Lactic acidosis ICD-10-CM: E87.2  ICD-9-CM: 276.2  2/2/2021 - Present Unknown        Acute respiratory failure with hypoxia [facility]) ICD-10-CM: J96.01  ICD-9-CM: 518.81  2/2/2021 - Present Unknown        Severe sepsis with acute organ dysfunction [facility]) ICD-10-CM: A41.9, R65.20  ICD-9-CM: 038.9, 995.92  2/2/2021 - Present Unknown        Multifocal pneumonia ICD-10-CM: J18.9  ICD-9-CM: 376  2/2/2021 - Present Unknown        COVID-19 virus infection ICD-10-CM: U07.1  ICD-9-CM: 079.89  2/2/2021 - Present Unknown               Plan:    Acute Respiratory failure with hypoxia due to COVID19  COVID-19 Virus Infection  Covid19 positive test on 2/3/2021. Symptoms onset 2/2/2021  - convalescent plasma consent obtained and ordered  - remdesivir (EOT 2/7 )  - O2 as needed to keep sat > 92%. Currently on 4L NC  - Decadron 6mg daily, zinc, Vitamin D and vitamin C  - mucinex, tessalon pearls  - albuterol MDI , Respiratory Care consult for assessment/education for MDI use    HTN // HLD: continue with home medications      Diet:  DIET REGULAR  DVT PPx: lovenox    Code: DNR  Estimated LOS:  Greater than 2 midnights    Labs/Imaging personally reviewed. Risk:  HIGH risk     Plan discussed with staff, patient/family and are in agreement. Part of this note was written by using a voice dictation software and the note has been proof read but may still contain some grammatical/other typographical errors.      Signed:  Mark Garcia MD  February 3, 2021

## 2021-02-03 NOTE — PROGRESS NOTES
ACUTE PHYSICAL THERAPY GOALS:  (Developed with and agreed upon by patient and/or caregiver. )  LTG:  (1.)Mr. Mercedes Murillo will move from supine to sit and sit to supine , scoot up and down and roll side to side in bed with MINIMAL ASSIST within 7 treatment day(s). (2.)Mr. Mercedes Murillo will transfer from bed to chair and chair to bed with MINIMAL ASSIST using the least restrictive device within 7 treatment day(s). (3.)Mr. Mercedes Murillo will ambulate with MINIMAL ASSIST for 50 feet with the least restrictive device within 7 treatment day(s). (4.)Mr. Mercedes Murillo will participate in therapeutic activity/exercises x 25 minutes for increased strength within 7 treatment days.     ________________________________________________________________________________________________      PHYSICAL THERAPY ASSESSMENT: Initial Assessment and PM PT Treatment Day # 1      Archie Toscano is a 69 y.o. male   PRIMARY DIAGNOSIS: Severe sepsis with acute organ dysfunction (HCC)  Acute respiratory failure with hypoxia ([facility] [J96.01]  COVID-19 virus infection [U07.1]  Multifocal pneumonia [J18.9]  Severe sepsis with acute organ dysfunction (HCC) [A41.9, R65.20]  Lactic acidosis [E87.2]       Reason for Referral:    ICD-10: Treatment Diagnosis: Generalized Muscle Weakness (M62.81)  Difficulty in walking, Not elsewhere classified (R26.2)  INPATIENT: Payor: BLUE CROSS [employer] / Plan: [place] / Product Type: PPO /     ASSESSMENT:     REHAB RECOMMENDATIONS:   Recommendation to date pending progress:  Setting:   Short-term Rehab  Equipment:    To Be Determined     PRIOR LEVEL OF FUNCTION:  (Prior to Hospitalization) INITIAL/CURRENT LEVEL OF FUNCTION:  (Most Recently Demonstrated)   Bed Mobility:   Unknown  Sit to Stand:   Unknown  Transfers:   Unknown  Gait/Mobility:   Unknown Bed Mobility:   Maximal Assistance  Sit to Stand:   Not tested  Transfers:   Not tested  Gait/Mobility:   Not tested     ASSESSMENT:  Mr. Mercedes Murillo presents to PT with decreased AROM and decreased strength in B LEs. Pt only oriented x 1 with minimal command following. He required maxA for supine to sit and demonstrated poor sitting balance. Pt became agitated with activity and treatment ended. Per chart pt lives at an [facility]. Mr. Lolita Willson could benefit from skilled PT as he is currently functioning below his baseline. SUBJECTIVE:   Mr. Lolita Willson states, [address]. \"    SOCIAL HISTORY/LIVING ENVIRONMENT: Lives at [facility]     OBJECTIVE:     PAIN: VITAL SIGNS: LINES/DRAINS:   Pre Treatment: Pain Screen  Pain Scale 1: Numeric (0 - 10)  Pain Intensity 1: 0  Post Treatment: 0   IV  O2 Device: Nasal cannula     GROSS EVALUATION:  B LEs Within Functional Limits Abnormal/ Functional Abnormal/ Non-Functional (see comments) Not Tested Comments:   AROM [] [x] [] []    PROM [] [] [] []    Strength [] [] [x] []    Balance [] [] [x] [] POOR sitting   Posture [] [] [] []    Sensation [] [] [] []    Coordination [] [] [x] []    Tone [] [] [] []    Edema [] [] [] []    Activity Tolerance [] [x] [] [] Agitated with activity    [] [] [] []      COGNITION/  PERCEPTION: Intact Impaired   (see comments) Comments:   Orientation [] [x] X 1   Vision [] []    Hearing [] []    Command Following [] [x] decreased   Safety Awareness [] [x] decreased    [] []      MOBILITY: I Mod I S SBA CGA Min Mod Max Total  NT x2 Comments:   Bed Mobility    Rolling [] [] [] [] [] [] [] [] [] [] []    Supine to Sit [] [] [] [] [] [] [] [x] [] [] []    Scooting [] [] [] [] [] [] [] [] [x] [] []    Sit to Supine [] [] [] [] [] [] [] [x] [] [] []    Transfers    Sit to Stand [] [] [] [] [] [] [] [] [] [x] []    Bed to Chair [] [] [] [] [] [] [] [] [] [x] []    Stand to Sit [] [] [] [] [] [] [] [] [] [x] []    I=Independent, Mod I=Modified Independent, S=Supervision, SBA=Standby Assistance, CGA=Contact Guard Assistance,   Min=Minimal Assistance, Mod=Moderate Assistance, Max=Maximal Assistance, Total=Total Assistance, NT=Not Tested      [employer]AC[employer] 6 Clicks   Basic Mobility Inpatient Short Form       How much difficulty does the patient currently have. .. Unable A Lot A Little None   1. Turning over in bed (including adjusting bedclothes, sheets and blankets)? [] 1   [x] 2   [] 3   [] 4   2. Sitting down on and standing up from a chair with arms ( e.g., wheelchair, bedside commode, etc.)   [] 1   [x] 2   [] 3   [] 4   3. Moving from lying on back to sitting on the side of the bed? [] 1   [x] 2   [] 3   [] 4   How much help from another person does the patient currently need. .. Total A Lot A Little None   4. Moving to and from a bed to a chair (including a wheelchair)? [] 1   [x] 2   [] 3   [] 4   5. Need to walk in hospital room? [] 1   [x] 2   [] 3   [] 4   6. Climbing 3-5 steps with a railing? [] 1   [x] 2   [] 3   [] 4   © 2007, Trustees of [address] [place] [address], under license to [employer]. All rights reserved     Score:  Initial: 12 Most Recent: X (Date: -- )    Interpretation of Tool:  Represents activities that are increasingly more difficult (i.e. Bed mobility, Transfers, Gait). PLAN:   FREQUENCY/DURATION: PT Plan of Care: 3 times/week for duration of hospital stay or until stated goals are met, whichever comes first.    PROBLEM LIST:   (Skilled intervention is medically necessary to address:)  1. Decreased ADL/Functional Activities  2. Decreased Activity Tolerance  3. Decreased AROM/PROM  4. Decreased Balance  5. Decreased Cognition  6. Decreased Coordination  7. Decreased Gait Ability  8. Decreased Strength  9. Decreased Transfer Abilities   INTERVENTIONS PLANNED:   (Benefits and precautions of physical therapy have been discussed with the patient.)  1. Therapeutic Activity  2. Therapeutic Exercise/HEP  3. Neuromuscular Re-education  4. Gait Training  5. Manual Therapy  6.  Education     TREATMENT:     EVALUATION: Low Complexity : (Untimed Charge)    TREATMENT:   ($$ Therapeutic Activity: 8-22 mins )  Therapeutic Activity (8 Minutes): Therapeutic activity included Supine to Sit, Sit to Supine, Scooting and Sitting balance  to improve functional Mobility, Strength and Activity tolerance.     AFTER TREATMENT POSITION/PRECAUTIONS:  Alarm Activated, Bed and RN notified    INTERDISCIPLINARY COLLABORATION:  RN/PCT and PT/PTA    TOTAL TREATMENT DURATION:  PT Patient Time In/Time Out  Time In: 1425  Time Out: [address] PT, DPT

## 2021-02-03 NOTE — ACP (ADVANCE CARE PLANNING)
Advance Care Planning     General Advance Care Planning (ACP) Conversation      Date of Conversation: 2/2/2021  Conducted with: Patient with Decision Making Capacity and Healthcare Decision Maker: Named in Advance Directive or Healthcare Power of [address] Kee Jenkins:     Primary Decision Maker: Dean Rodriguez - Spouse - [phone]  Click here to complete [employer] including selection of the Healthcare Decision Maker Relationship (ie \"Primary\")  Today we documented Decision Maker(s). The patient will provide ACP documents. Content/Action Overview: Has ACP document(s) NOT on file - requested patient to provide  Reviewed DNR/DNI and patient confirms current DNR status - completed forms on file (place new order if needed)  Topics discussed: ventilation preferences and resuscitation preferences  Additional Comments: Pt's spouse provided a copy of POA. CM placed it in pt's hard chart for medical records.      Length of Voluntary ACP Conversation in minutes:  20 minutes    Abhijeet Ozuna

## 2021-02-03 NOTE — ACP (ADVANCE CARE PLANNING)
Goals of Care Discussion / [facility] - Initial Encounter    Date of conversation: 02/03/21    Patient is AAOx1 and does not have adequate capacity to make medical decisions. As such, this encounter was discussed with his wife, who is his POA. Discussed the current conditions, workup/management plans as well as prognosis. The POA has been informed and is fully aware of the sufficient risks of the active diagnosis and risk for further deterioration due to the underlying condition.       Time Spent face to face with patient/family:  > 15mins (This is addition to time spent for clinical care)        Code Status: DNR   Primary Decision Maker: Talitha Moritz ([phone])      Penelope Bynum MD

## 2021-02-04 LAB
ALBUMIN SERPL-MCNC: 2.6 G/DL (ref 3.2–4.6)
ALBUMIN/GLOB SERPL: 0.7 {RATIO} (ref 1.2–3.5)
ALP SERPL-CCNC: 70 U/L (ref 50–136)
ALT SERPL-CCNC: 52 U/L (ref 12–65)
ANION GAP SERPL CALC-SCNC: 6 MMOL/L (ref 7–16)
AST SERPL-CCNC: 67 U/L (ref 15–37)
BILIRUB SERPL-MCNC: 0.5 MG/DL (ref 0.2–1.1)
BLD PROD TYP BPU: NORMAL
BPU ID: NORMAL
BUN SERPL-MCNC: 27 MG/DL (ref 8–23)
CALCIUM SERPL-MCNC: 8 MG/DL (ref 8.3–10.4)
CHLORIDE SERPL-SCNC: 116 MMOL/L (ref 98–107)
CO2 SERPL-SCNC: 25 MMOL/L (ref 21–32)
CREAT SERPL-MCNC: 1.28 MG/DL (ref 0.8–1.5)
ERYTHROCYTE [DISTWIDTH] IN BLOOD BY AUTOMATED COUNT: 12.6 % (ref 11.9–14.6)
GLOBULIN SER CALC-MCNC: 3.6 G/DL (ref 2.3–3.5)
GLUCOSE BLD STRIP.AUTO-MCNC: 106 MG/DL (ref 65–100)
GLUCOSE BLD STRIP.AUTO-MCNC: 144 MG/DL (ref 65–100)
GLUCOSE SERPL-MCNC: 126 MG/DL (ref 65–100)
HCT VFR BLD AUTO: 39 % (ref 41.1–50.3)
HGB BLD-MCNC: 12.8 G/DL (ref 13.6–17.2)
MCH RBC QN AUTO: 29.2 PG (ref 26.1–32.9)
MCHC RBC AUTO-ENTMCNC: 32.8 G/DL (ref 31.4–35)
MCV RBC AUTO: 89 FL (ref 79.6–97.8)
MM INDURATION POC: 0 MM (ref 0–5)
NRBC # BLD: 0 K/UL (ref 0–0.2)
PLATELET # BLD AUTO: 153 K/UL (ref 150–450)
PMV BLD AUTO: 11.1 FL (ref 9.4–12.3)
POTASSIUM SERPL-SCNC: 3.9 MMOL/L (ref 3.5–5.1)
PPD POC: NEGATIVE NEGATIVE
PROT SERPL-MCNC: 6.2 G/DL (ref 6.3–8.2)
RBC # BLD AUTO: 4.38 M/UL (ref 4.23–5.6)
SODIUM SERPL-SCNC: 147 MMOL/L (ref 136–145)
STATUS OF UNIT,%ST: NORMAL
UNIT DIVISION, %UDIV: NORMAL
WBC # BLD AUTO: 7.9 K/UL (ref 4.3–11.1)

## 2021-02-04 PROCEDURE — 82962 GLUCOSE BLOOD TEST: CPT

## 2021-02-04 PROCEDURE — 97166 OT EVAL MOD COMPLEX 45 MIN: CPT

## 2021-02-04 PROCEDURE — 80053 COMPREHEN METABOLIC PANEL: CPT

## 2021-02-04 PROCEDURE — 65270000029 HC RM PRIVATE

## 2021-02-04 PROCEDURE — 74011250637 HC RX REV CODE- 250/637: Performed by: INTERNAL MEDICINE

## 2021-02-04 PROCEDURE — 85027 COMPLETE CBC AUTOMATED: CPT

## 2021-02-04 PROCEDURE — 97530 THERAPEUTIC ACTIVITIES: CPT

## 2021-02-04 PROCEDURE — 74011250636 HC RX REV CODE- 250/636: Performed by: INTERNAL MEDICINE

## 2021-02-04 PROCEDURE — 74011000250 HC RX REV CODE- 250: Performed by: INTERNAL MEDICINE

## 2021-02-04 PROCEDURE — 97535 SELF CARE MNGMENT TRAINING: CPT

## 2021-02-04 PROCEDURE — 74011000258 HC RX REV CODE- 258: Performed by: INTERNAL MEDICINE

## 2021-02-04 RX ORDER — DEXTROSE MONOHYDRATE 50 MG/ML
75 INJECTION, SOLUTION INTRAVENOUS CONTINUOUS
Status: DISCONTINUED | OUTPATIENT
Start: 2021-02-04 | End: 2021-02-05

## 2021-02-04 RX ADMIN — GUAIFENESIN 600 MG: 600 TABLET ORAL at 08:35

## 2021-02-04 RX ADMIN — ENOXAPARIN SODIUM 30 MG: 40 INJECTION SUBCUTANEOUS at 08:34

## 2021-02-04 RX ADMIN — ASPIRIN 81 MG: 81 TABLET ORAL at 08:34

## 2021-02-04 RX ADMIN — ENOXAPARIN SODIUM 30 MG: 40 INJECTION SUBCUTANEOUS at 21:23

## 2021-02-04 RX ADMIN — Medication 10 ML: at 21:24

## 2021-02-04 RX ADMIN — ZINC SULFATE 220 MG (50 MG) CAPSULE 220 MG: CAPSULE at 08:35

## 2021-02-04 RX ADMIN — PANTOPRAZOLE SODIUM 40 MG: 40 TABLET, DELAYED RELEASE ORAL at 08:35

## 2021-02-04 RX ADMIN — Medication 10 ML: at 13:07

## 2021-02-04 RX ADMIN — REMDESIVIR 100 MG: 100 INJECTION, POWDER, LYOPHILIZED, FOR SOLUTION INTRAVENOUS at 05:12

## 2021-02-04 RX ADMIN — DEXTROSE MONOHYDRATE 75 ML/HR: 5 INJECTION, SOLUTION INTRAVENOUS at 07:35

## 2021-02-04 RX ADMIN — ROSUVASTATIN CALCIUM 20 MG: 20 TABLET, COATED ORAL at 08:35

## 2021-02-04 RX ADMIN — GUAIFENESIN 600 MG: 600 TABLET ORAL at 21:23

## 2021-02-04 RX ADMIN — CHOLECALCIFEROL TAB 125 MCG (5000 UNIT) 5000 UNITS: 125 TAB at 08:35

## 2021-02-04 RX ADMIN — OXYCODONE HYDROCHLORIDE AND ACETAMINOPHEN 1000 MG: 500 TABLET ORAL at 17:33

## 2021-02-04 RX ADMIN — OXYCODONE HYDROCHLORIDE AND ACETAMINOPHEN 1000 MG: 500 TABLET ORAL at 08:35

## 2021-02-04 RX ADMIN — DEXAMETHASONE SODIUM PHOSPHATE 6 MG: 10 INJECTION INTRAMUSCULAR; INTRAVENOUS at 08:35

## 2021-02-04 RX ADMIN — SODIUM CHLORIDE, SODIUM LACTATE, POTASSIUM CHLORIDE, CALCIUM CHLORIDE, AND DEXTROSE MONOHYDRATE 75 ML/HR: 600; 310; 30; 20; 5 INJECTION, SOLUTION INTRAVENOUS at 03:07

## 2021-02-04 RX ADMIN — Medication 10 ML: at 05:12

## 2021-02-04 RX ADMIN — DEXTROSE MONOHYDRATE 75 ML/HR: 5 INJECTION, SOLUTION INTRAVENOUS at 22:18

## 2021-02-04 NOTE — PROGRESS NOTES
ACUTE PHYSICAL THERAPY GOALS:  (Developed with and agreed upon by patient and/or caregiver. )  LTG:  (1.)Mr. Tano Beyer will move from supine to sit and sit to supine , scoot up and down and roll side to side in bed with MINIMAL ASSIST within 7 treatment day(s). (2.)Mr. Tano Beyer will transfer from bed to chair and chair to bed with MINIMAL ASSIST using the least restrictive device within 7 treatment day(s). (3.)Mr. Tano Beyer will ambulate with MINIMAL ASSIST for 50 feet with the least restrictive device within 7 treatment day(s). (4.)Mr. Tano Beyer will participate in therapeutic activity/exercises x 25 minutes for increased strength within 7 treatment days. PHYSICAL THERAPY: Daily Note and AM Treatment Day # 2    Elif Patterson is a 69 y.o. male   PRIMARY DIAGNOSIS: Severe sepsis with acute organ dysfunction ([facility]  Acute respiratory failure with hypoxia ([facility] [J96.01]  COVID-19 virus infection [U07.1]  Multifocal pneumonia [J18.9]  Severe sepsis with acute organ dysfunction ([facility] [A41.9, R65.20]  Lactic acidosis [E87.2]         ASSESSMENT:     REHAB RECOMMENDATIONS: CURRENT LEVEL OF FUNCTION:  (Most Recently Demonstrated)   Recommendation to date pending progress:  Setting:   Short-term Rehab  Equipment:    To Be Determined Bed Mobility:   Maximal Assistance x 2  Sit to Stand:   Maximal Assistance x 2  Transfers:   Maximal Assistance x 2  Gait/Mobility:   Unable to perform     ASSESSMENT:  Mr. Tano Beyer appeared more agreeable today. He was able to follow more simple commands with B LEs. Pt performed bed mobility and STS transfers today with maxA x 2. His standing balance is poor and pt really unable to take side steps even at EOB. OT present and assisted with ADLs while PT addressed transfers. Slight progress in activity tolerance today. SUBJECTIVE:   Mr. Tano Beyer states, \"Take care. \"    SOCIAL HISTORY/ LIVING ENVIRONMENT: See eval     OBJECTIVE:     PAIN: VITAL SIGNS: LINES/DRAINS:   Pre Treatment: Pain Screen  Pain Scale 1: Numeric (0 - 10)  Pain Intensity 1: 0  Post Treatment: 0   IV  O2 Device: Nasal cannula     MOBILITY: I Mod I S SBA CGA Min Mod Max Total  NT x2 Comments:   Bed Mobility    Rolling [] [] [] [] [] [] [] [] [] [] []    Supine to Sit [] [] [] [] [] [] [] [] [] [] []    Scooting [] [] [] [] [] [] [] [] [] [] []    Sit to Supine [] [] [] [] [] [] [] [] [] [] []    Transfers    Sit to Stand [] [] [] [] [] [] [] [] [] [] []    Bed to Chair [] [] [] [] [] [] [] [] [] [] []    Stand to Sit [] [] [] [] [] [] [] [] [] [] []    I=Independent, Mod I=Modified Independent, S=Supervision, SBA=Standby Assistance, CGA=Contact Guard Assistance,   Min=Minimal Assistance, Mod=Moderate Assistance, Max=Maximal Assistance, Total=Total Assistance, NT=Not Tested    GAIT: I Mod I S SBA CGA Min Mod Max Total  NT x2 Comments:   Level of Assistance [] [] [] [] [] [] [] [] [] [x] []    Distance Unable    DME B HHA    Gait Quality Unable to perform    Weightbearing  Status N/A     I=Independent, Mod I=Modified Independent, S=Supervision, SBA=Standby Assistance, CGA=Contact Guard Assistance,   Min=Minimal Assistance, Mod=Moderate Assistance, Max=Maximal Assistance, Total=Total Assistance, NT=Not Tested    PLAN:   FREQUENCY/DURATION: PT Plan of Care: 3 times/week for duration of hospital stay or until stated goals are met, whichever comes first.  TREATMENT:     TREATMENT:   ($$ Therapeutic Activity: 23-37 mins    )  Co-Treatment PT/OT necessary due to patient's decreased overall endurance/tolerance levels, as well as need for high level skilled assistance to complete functional transfers/mobility and functional tasks  Therapeutic Activity (23 Minutes): Therapeutic activity included Rolling, Supine to Sit, Sit to Supine, Scooting, Transfer Training, Sitting balance  and Standing balance to improve functional Mobility, Strength and Activity tolerance.     AFTER TREATMENT POSITION/PRECAUTIONS:  Alarm Activated, Bed, Restraints  and RN notified    INTERDISCIPLINARY COLLABORATION:  RN/PCT, PT/PTA and OT/VALENTINE    TOTAL TREATMENT DURATION:  PT Patient Time In/Time Out  Time In: 1010  Time Out: [address] [facility] Aquilino Lassiter DPT

## 2021-02-04 NOTE — PROGRESS NOTES
ACUTE OT GOALS:  (Developed with and agreed upon by patient and/or caregiver.)  1. Patient will complete lower body bathing and dressing with Mod A and adaptive equipment as needed. 2. Patient will complete toileting with Mod A and adaptive equipment as needed. 3. Patient will complete bed mobility with Min A and adaptive equipment as needed. 4. Patient will complete seated ADL for at least 8 minutes with good static and good dynamic seated balance. 5. Patient will complete functional transfers with Min A and adaptive equipment as needed. 6. Patient will complete SPT from bed to chair/BSC with Min A and adaptive equipment as needed. Timeframe: 7 visits     OCCUPATIONAL THERAPY ASSESSMENT: Initial Assessment, Daily Note and AM OT Treatment Day # 1    Durga Sterling is a 69 y.o. male   PRIMARY DIAGNOSIS: Severe sepsis with acute organ dysfunction (HCC)  Acute respiratory failure with hypoxia ([facility] [address] [J96.01]  COVID-19 virus infection [U07.1]  Multifocal pneumonia [J18.9]  Severe sepsis with acute organ dysfunction (HCC) [A41.9, R65.20]  Lactic acidosis [E87.2]       Reason for Referral:  Generalized Weakness  ICD-10: Treatment Diagnosis: Generalized Muscle Weakness (M62.81)  Other lack of cordination (R27.8)  Difficulty in walking, Not elsewhere classified (R26.2)  INPATIENT: Payor: [employer] / Plan: [facility] / Product Type: PPO /   ASSESSMENT:     REHAB RECOMMENDATIONS:   Recommendation to date pending progress:  Setting:   Short-term Rehab vs Return to GET  Equipment:    To Be Determined     PRIOR LEVEL OF FUNCTION:  (Prior to Hospitalization)  INITIAL/CURRENT LEVEL OF FUNCTION:  (Based on today's evaluation)   Bathing:   Unknown  Dressing:   Unknown  Feeding/Grooming:   Unknown  Toileting:   Unknown  Functional Mobility:   Unknown Bathing:   Maximal Assistance  Dressing:   Maximal Assistance  Feeding/Grooming:   Minimal Assistance to bring cup to mouth.   Toileting:   Total Assistance  Functional Mobility:   Maximal Assistance x 2     ASSESSMENT:  Mr. Luis Nash was admitted for acute respiratory failure with hypoxia and Covid 19+. Pt presents with decreased strength, balance, and activity tolerance for performance of ADLs and functional mobility. Pt also presents with increased confusion, requiring additional cueing to participate in bed mobility, functional transfers, and ADLs. Pt becomes more alert as session progresses. Pt requires Max A x 2 to complete all bed mobility, multiple functional transfers from sit to stand, and one sidestep to L side of bed. Following return to supine, pt observed to have saturated diaper brief and requires Total A for brief management and perineal and genital wiping. Pt bed placed in chair position and pt requires Min A to complete self-feeding with assist needed to bring Ensure shake to mouth. Pt requires SBA to bring finger food (levi cracker) to mouth. Pt would benefit from skilled OT to increase independence and safety for performance of ADLs and functional mobility. At this time, patient is appropriate for Co-treatment with physical therapy due to patient's decreased overall endurance/tolerance levels, as well as need for high level skilled assistance to complete functional transfers/mobility and functional tasks. Greg Barnett is appropriate for a multidisciplinary co-treatment of PT and OT to address goals of both disciplines. SUBJECTIVE:   Mr. Luis Nash states, \"Take care. \"    SOCIAL HISTORY/LIVING ENVIRONMENT: Pt was admitted from memory care unit at [facility] Pt prior level of function for ADLs and functional mobility unknown as pt has PMH of Dementia.     OBJECTIVE:     PAIN: VITAL SIGNS: LINES/DRAINS:   Pre Treatment: Pain Screen  Pain Scale 1: Numeric (0 - 10)  Pain Intensity 1: 0  Post Treatment: Unchanged Vital Signs  O2 Sat (%): 94 %  O2 Device: Nasal cannula  O2 Flow Rate (L/min): 2 l/min IV  O2 Device: Nasal cannula GROSS EVALUATION:  BUE Within Functional Limits Abnormal/ Functional Abnormal/ Non-Functional (see comments) Not Tested Comments:   AROM [x] [] [] []    PROM [] [] [] [x]    Strength [] [x] [] []    Balance [] [x] [] []    Posture [] [x] [] []    Sensation [] [] [] [x] Due to cognitive deficits. Coordination [] [x] [] []    Tone [] [] [] [x]    Edema [] [] [] [x]    Activity Tolerance [] [x] [] []     [] [] [] []      COGNITION/  PERCEPTION: Intact Impaired   (see comments) Comments:   Orientation [] [x] Pt with dementia at baseline. Vision [x] []    Hearing [x] []    Judgment/ Insight [] [x]    Attention [] [x]    Memory [] [x]    Command Following [] [x]    Emotional Regulation [] [] Not tested.    [] []      ACTIVITIES OF DAILY LIVING: I Mod I S SBA CGA Min Mod Max Total NT Comments   BASIC ADLs:              Bathing/ Showering [] [] [] [] [] [] [] [] [] [x]    Toileting [] [] [] [] [] [] [] [] [x] [] Brief management and wiping. Dressing [] [] [] [] [] [] [] [] [x] [] For sock management. Feeding [] [] [] [] [] [x] [] [] [] [] To bring cup to mouth; SBA to bring finger food to mouth. Grooming [] [] [] [] [] [] [] [] [] [x]    Personal Device Care [] [] [] [] [] [] [] [] [] [x]    Functional Mobility [] [] [] [] [] [] [] [x] [] [] Assist x 2   I=Independent, Mod I=Modified Independent, S=Supervision, SBA=Standby Assistance, CGA=Contact Guard Assistance,   Min=Minimal Assistance, Mod=Moderate Assistance, Max=Maximal Assistance, Total=Total Assistance, NT=Not Tested    MOBILITY: I Mod I S SBA CGA Min Mod Max Total  NT x2 Comments:   Supine to sit [] [] [] [] [] [] [] [x] [] [] [x]    Sit to supine [] [] [] [] [] [] [] [x] [] [] [x]    Sit to stand [] [] [] [] [] [] [] [x] [] [] [x]    Bed to chair [] [] [] [] [] [] [] [] [] [x] [] Max A x 2 to take one sidestep.    I=Independent, Mod I=Modified Independent, S=Supervision, SBA=Standby Assistance, CGA=Contact Guard Assistance,   Min=Minimal Assistance, Mod=Moderate Assistance, Max=Maximal Assistance, Total=Total Assistance, NT=Not Tested    [employer] 6 Clicks   Daily Activity Inpatient Short Form        How much help from another person does the patient currently need. .. Total A Lot A Little None   1. Putting on and taking off regular lower body clothing? [x] 1   [] 2   [] 3   [] 4   2. Bathing (including washing, rinsing, drying)? [] 1   [x] 2   [] 3   [] 4   3. Toileting, which includes using toilet, bedpan or urinal?   [x] 1   [] 2   [] 3   [] 4   4. Putting on and taking off regular upper body clothing? [] 1   [x] 2   [] 3   [] 4   5. Taking care of personal grooming such as brushing teeth? [] 1   [x] 2   [] 3   [] 4   6. Eating meals? [] 1   [] 2   [x] 3   [] 4   © 2007, Trustees of [facility] [employer], under license to [employer]). All rights reserved     Score:  Initial: 11, completed, 2/4/2021 Most Recent: X (Date: -- )   Interpretation of Tool:  Represents activities that are increasingly more difficult (i.e. Bed mobility, Transfers, Gait). PLAN:   FREQUENCY/DURATION: OT Plan of Care: 3 times/week for duration of hospital stay or until stated goals are met, whichever comes first.    PROBLEM LIST:   (Skilled intervention is medically necessary to address:)  1. Decreased ADL/Functional Activities  2. Decreased Activity Tolerance  3. Decreased Balance  4. Decreased Cognition  5. Decreased Coordination  6. Decreased Gait Ability  7. Decreased Strength  8. Decreased Transfer Abilities   INTERVENTIONS PLANNED:   (Benefits and precautions of occupational therapy have been discussed with the patient.)  1. Self Care Training  2. Therapeutic Activity  3. Therapeutic Exercise/HEP  4. Neuromuscular Re-education  5.  Education     TREATMENT:     EVALUATION: Moderate Complexity : (Untimed Charge)    TREATMENT:   Self Care (25 Minutes): Self care including Toileting and Grooming to increase independence and decrease level of assistance required. Today's treatment session addressed Decreased Strength, Decreased ADL/Functional Activities, Decreased Activity Tolerance, Increased Fatigue and Decreased Cognition to progress towards achieving goal(s) listed above. During this session, Physical Therapy addressed  Functional Transfers and Balance to progress towards their discipline specific goal(s). Co-treatment was necessary to improve patient's cognitive participation, ability to follow higher level commands, ability to increase activity demands and ability to return to normal functional activity. AFTER TREATMENT POSITION/PRECAUTIONS:  Alarm Activated, Bed, Needs within reach, RN notified and bed placed in chair position and BUE mittens donned.     INTERDISCIPLINARY COLLABORATION:  RN/PCT, PT/PTA and OT/VALENTINE    TOTAL TREATMENT DURATION:  OT Patient Time In/Time Out  Time In: 1010  Time Out: 9975 Dahlia Jose OTR/L

## 2021-02-04 NOTE — DISCHARGE INSTRUCTIONS
DISCHARGE SUMMARY from Nurse    PATIENT INSTRUCTIONS:    After general anesthesia or intravenous sedation, for 24 hours or while taking prescription Narcotics:  · Limit your activities  · Do not drive and operate hazardous machinery  · Do not make important personal or business decisions  · Do  not drink alcoholic beverages  · If you have not urinated within 8 hours after discharge, please contact your surgeon on call. What to do at Home:  Recommended activity: Activity as tolerated. If you experience any of the following symptoms worsening cough or wheezing, shortness of breath or fatigue not relieved with rest, please follow up with MD.    *  Please give a list of your current medications to your Primary Care Provider. *  Please update this list whenever your medications are discontinued, doses are      changed, or new medications (including over-the-counter products) are added. *  Please carry medication information at all times in case of emergency situations. These are general instructions for a healthy lifestyle:    No smoking/ No tobacco products/ Avoid exposure to second hand smoke  Surgeon General's Warning:  Quitting smoking now greatly reduces serious risk to your health. Obesity, smoking, and sedentary lifestyle greatly increases your risk for illness    A healthy diet, regular physical exercise & weight monitoring are important for maintaining a healthy lifestyle    You may be retaining fluid if you have a history of heart failure or if you experience any of the following symptoms:  Weight gain of 3 pounds or more overnight or 5 pounds in a week, increased swelling in our hands or feet or shortness of breath while lying flat in bed. Please call your doctor as soon as you notice any of these symptoms; do not wait until your next office visit. The discharge information has been reviewed with the caregiver. The caregiver verbalized understanding.   Discharge medications reviewed with the caregiver and appropriate educational materials and side effects teaching were provided. .Advance Care Planning  People with COVID-19 may have no symptoms, mild symptoms, such as fever, cough, and shortness of breath or they may have more severe illness, developing severe and fatal pneumonia. As a result, Advance Care Planning with attention to naming a health care decision maker (someone you trust to make healthcare decisions for you if you could not speak for yourself) and sharing other health care preferences is important BEFORE a possible health crisis. Please contact your Primary Care Provider to discuss Advance Care Planning. Preventing the Spread of Coronavirus Disease 2019 in Homes and Residential Communities  For the most recent information go to [email or web address]    Prevention steps for People with confirmed or suspected COVID-19 (including persons under investigation) who do not need to be hospitalized  and   People with confirmed COVID-19 who were hospitalized and determined to be medically stable to go home    Your healthcare provider and public health staff will evaluate whether you can be cared for at home. If it is determined that you do not need to be hospitalized and can be isolated at home, you will be monitored by staff from your local or state health department. You should follow the prevention steps below until a healthcare provider or local or state health department says you can return to your normal activities. Stay home except to get medical care  People who are mildly ill with COVID-19 are able to isolate at home during their illness. You should restrict activities outside your home, except for getting medical care. Do not go to work, school, or public areas. Avoid using public transportation, ride-sharing, or taxis.   Separate yourself from other people and animals in your home  People: As much as possible, you should stay in a specific room and away from other people in your home. Also, you should use a separate bathroom, if available. Animals: You should restrict contact with pets and other animals while you are sick with COVID-19, just like you would around other people. Although there have not been reports of pets or other animals becoming sick with COVID-19, it is still recommended that people sick with COVID-19 limit contact with animals until more information is known about the virus. When possible, have another member of your household care for your animals while you are sick. If you are sick with COVID-19, avoid contact with your pet, including petting, snuggling, being kissed or licked, and sharing food. If you must care for your pet or be around animals while you are sick, wash your hands before and after you interact with pets and wear a facemask. Call ahead before visiting your doctor  If you have a medical appointment, call the healthcare provider and tell them that you have or may have COVID-19. This will help the healthcare providers office take steps to keep other people from getting infected or exposed. Wear a facemask  You should wear a facemask when you are around other people (e.g., sharing a room or vehicle) or pets and before you enter a healthcare providers office. If you are not able to wear a facemask (for example, because it causes trouble breathing), then people who live with you should not stay in the same room with you, or they should wear a facemask if they enter your room. Cover your coughs and sneezes  Cover your mouth and nose with a tissue when you cough or sneeze. Throw used tissues in a lined trash can. Immediately wash your hands with soap and water for at least 20 seconds or, if soap and water are not available, clean your hands with an alcohol-based hand  that contains at least 60% alcohol.   Clean your hands often  Wash your hands often with soap and water for at least 20 seconds, especially after blowing your nose, coughing, or sneezing; going to the bathroom; and before eating or preparing food. If soap and water are not readily available, use an alcohol-based hand  with at least 60% alcohol, covering all surfaces of your hands and rubbing them together until they feel dry. Soap and water are the best option if hands are visibly dirty. Avoid touching your eyes, nose, and mouth with unwashed hands. Avoid sharing personal household items  You should not share dishes, drinking glasses, cups, eating utensils, towels, or bedding with other people or pets in your home. After using these items, they should be washed thoroughly with soap and water. Clean all high-touch surfaces everyday  High touch surfaces include counters, tabletops, doorknobs, bathroom fixtures, toilets, phones, keyboards, tablets, and bedside tables. Also, clean any surfaces that may have blood, stool, or body fluids on them. Use a household cleaning spray or wipe, according to the label instructions. Labels contain instructions for safe and effective use of the cleaning product including precautions you should take when applying the product, such as wearing gloves and making sure you have good ventilation during use of the product. Monitor your symptoms  Seek prompt medical attention if your illness is worsening (e.g., difficulty breathing). Before seeking care, call your healthcare provider and tell them that you have, or are being evaluated for, COVID-19. Put on a facemask before you enter the facility. These steps will help the healthcare providers office to keep other people in the office or waiting room from getting infected or exposed. Ask your healthcare provider to call the local or [facility] health department.  Persons who are placed under active monitoring or facilitated self-monitoring should follow instructions provided by their local health department or occupational health professionals, as appropriate. When working with your local health department check their available hours. If you have a medical emergency and need to call 911, notify the dispatch personnel that you have, or are being evaluated for COVID-19. If possible, put on a facemask before emergency medical services arrive. Discontinuing home isolation  Patients with confirmed COVID-19 should remain under home isolation precautions until the risk of secondary transmission to others is thought to be low. The decision to discontinue home isolation precautions should be made on a case-by-case basis, in consultation with healthcare providers and state and local health departments. Patient Education        Learning About Hypoxemia  What is hypoxemia? Hypoxemia means that you don't have enough oxygen in your blood. It's a result of diseases that affect your heart or lungs. These include heart failure, COPD, and pulmonary fibrosis (scarring of the lungs). Being at high altitudes can also lead to hypoxemia. What happens when you have hypoxemia? Oxygen gets into your blood through your lungs. Your blood carries the oxygen to all parts of your body. When you have too little oxygen in your blood, your body doesn't get enough of it. With too little oxygen, your heart and other parts of your body don't work very well. What are the symptoms? In addition to the symptoms of whatever is causing your hypoxemia, you may:  · Get tired quickly. · Be short of breath when you are active. · Feel like your heart is pounding or racing. · Feel weak or dizzy. · Become confused. How is hypoxemia treated? Your doctor will do tests to find out how much oxygen is in your blood. He or she will look for the cause of your hypoxemia and treat that problem. For example, if you have heart failure, you may need medicines that help your heart pump better.   · If your hypoxemia is not severe, your doctor may give you oxygen through a mask or nasal cannula (say \"Kay-odell\"). A cannula is a thin tube with two openings that fit just inside your nose. · If your hypoxemia is severe, you may have a breathing tube put into your windpipe. The breathing tube is attached to a machine that pushes air into your lungs. This machine is called a ventilator. · If you have a long-term problem with hypoxemia, your doctor may recommend that you use oxygen regularly. Some people need it all the time. Others need it from time to time throughout the day or overnight. Your doctor will tell you how much oxygen you need and how often to use it. Follow-up care is a key part of your treatment and safety. Be sure to make and go to all appointments, and call your doctor if you are having problems. It's also a good idea to know your test results and keep a list of the medicines you take. Where can you learn more? Go to http://nandiniNazarcely.info/  Enter M375 in the search box to learn more about \"Learning About Hypoxemia. \"  Current as of: February 24, 2020               Content Version: 12.6  © 4331-9412 [employer]. Care instructions adapted under license by [employer] (which disclaims liability or warranty for this information). If you have questions about a medical condition or this instruction, always ask your healthcare professional. [place] [address] any warranty or liability for your use of this information. Patient Education        Pneumonia: Care Instructions  Your Care Instructions     Pneumonia is an infection of the lungs. Most cases are caused by infections from bacteria or viruses. Pneumonia may be mild or very severe. If it is caused by bacteria, you will be treated with antibiotics. It may take a few weeks to a few months to recover fully from pneumonia, depending on how sick you were and whether your overall health is good. Follow-up care is a key part of your treatment and safety.  Be sure to make and go to all appointments, and call your doctor if you are having problems. It's also a good idea to know your test results and keep a list of the medicines you take. How can you care for yourself at home? · Take your antibiotics exactly as directed. Do not stop taking the medicine just because you are feeling better. You need to take the full course of antibiotics. · Take your medicines exactly as prescribed. Call your doctor if you think you are having a problem with your medicine. · Get plenty of rest and sleep. You may feel weak and tired for a while, but your energy level will improve with time. · To prevent dehydration, drink plenty of fluids, enough so that your urine is light yellow or clear like water. Choose water and other caffeine-free clear liquids until you feel better. If you have kidney, heart, or liver disease and have to limit fluids, talk with your doctor before you increase the amount of fluids you drink. · Take care of your cough so you can rest. A cough that brings up mucus from your lungs is common with pneumonia. It is one way your body gets rid of the infection. But if coughing keeps you from resting or causes severe fatigue and chest-wall pain, talk to your doctor. He or she may suggest that you take a medicine to reduce the cough. · Use a vaporizer or humidifier to add moisture to your bedroom. Follow the directions for cleaning the machine. · Do not smoke or allow others to smoke around you. Smoke will make your cough last longer. If you need help quitting, talk to your doctor about stop-smoking programs and medicines. These can increase your chances of quitting for good. · Take an over-the-counter pain medicine, such as acetaminophen (Tylenol), ibuprofen (Advil, Motrin), or naproxen (Aleve). Read and follow all instructions on the label. · Do not take two or more pain medicines at the same time unless the doctor told you to.  Many pain medicines have acetaminophen, which is Tylenol. Too much acetaminophen (Tylenol) can be harmful. · If you were given a spirometer to measure how well your lungs are working, use it as instructed. This can help your doctor tell how your recovery is going. · To prevent pneumonia in the future, talk to your doctor about getting a flu vaccine (once a year) and a pneumococcal vaccine (one time only for most people). When should you call for help? Call 911 anytime you think you may need emergency care. For example, call if:    · You have severe trouble breathing. Call your doctor now or seek immediate medical care if:    · You cough up dark brown or bloody mucus (sputum).     · You have new or worse trouble breathing.     · You are dizzy or lightheaded, or you feel like you may faint. Watch closely for changes in your health, and be sure to contact your doctor if:    · You have a new or higher fever.     · You are coughing more deeply or more often.     · You are not getting better after 2 days (48 hours).     · You do not get better as expected. Where can you learn more? Go to http://www.restrepo.com/  Enter D336 in the search box to learn more about \"Pneumonia: Care Instructions. \"  Current as of: February 24, 2020               Content Version: 12.6  © 2006-2020 [employer]. Care instructions adapted under license by [employer] (which disclaims liability or warranty for this information). If you have questions about a medical condition or this instruction, always ask your healthcare professional. [place] [address] any warranty or liability for your use of this information. Patient Education        Sepsis: Care Instructions  Overview     Sepsis is an intense reaction to an infection. It can cause damage to the body and lead to dangerously low blood pressure. You may have inflammation across large areas of your body.  It can damage tissue and even go deep into your organs. Infections that can lead to sepsis include:  · A skin infection such as from a cut. · A lung infection like pneumonia. · A kidney infection. · A gut infection such as E. coli. Sepsis is treated with antibiotics. Your doctor will try to find the infection that led to sepsis. Jovita Danielson also get fluids through a vein (IV). Machines will track your vital signs, including temperature, blood pressure, breathing rate, and pulse rate. The physical and mental effects of sepsis may not be seen for several weeks after treatment. And they may last long after the infection is gone. Physical problems may include:  · Feeling weak and tired. · Feeling out of breath. · Aches and pains. · Problems with getting around. · Trouble falling asleep or staying asleep. · Dry and itchy skin, brittle nails, and hair loss. Some of these effects can lead to problems with your organs or your feet, legs, hands, or arms. Sepsis can also affect your mind and emotions. Problems may include:  · Self-doubt. · Anxiety. · Nightmares. · Depression and mood problems. · Wanting to avoid other people. · Confusion. · Flashbacks and bad memories of your illness. It's important to care for yourself and try to avoid infections. This may lower your risk of getting sepsis again. Follow-up care is a key part of your treatment and safety. Be sure to make and go to all appointments, and call your doctor if you are having problems. It's also a good idea to know your test results and keep a list of the medicines you take. How can you care for yourself at home? · Be safe with medicines. Take your medicines exactly as prescribed. Call your doctor if you think you are having a problem with your medicine. · If your doctor prescribed antibiotics, take them as directed. Do not stop taking them just because you feel better. You need to take the full course of antibiotics. · Help prevent infections that could again lead to sepsis.   ? Try to avoid colds and flu. If you must be around people who have a cold or the flu, wash your hands often. And get a flu vaccine every year. ? Ask your doctor if you need a pneumococcal vaccine (to prevent pneumonia, meningitis, and other infections). If you have had one before, ask your doctor if you need another dose. ? Clean any wounds or scrapes. · Do not smoke or use other tobacco products. When you quit smoking, you are less likely to get a cold, the flu, bronchitis, and pneumonia. If you need help quitting, talk to your doctor about stop-smoking programs and medicines. These can increase your chances of quitting for good. · Drink plenty of fluids to prevent dehydration. Choose water and other caffeine-free clear liquids until you feel better. If you have kidney, heart, or liver disease and have to limit fluids, talk with your doctor before you increase the amount of fluids you drink. · Eat a healthy diet. Include fruits, vegetables, and whole grains in your diet every day. · If your doctor recommends it, try doing some physical activity. Walking is a good choice. Bit by bit, increase the amount you walk every day. · Talk with your family and friends about your challenges. Ask for help if you need it. · Keep a journal. Writing down your thoughts and feelings can help reduce your stress. · Ask family members to fill in gaps in your memory. · Set small goals for yourself that you can reach. Reward yourself for success. When should you call for help? Call  911 anytime you think you may need emergency care. For example, call if:    · You passed out (lost consciousness). Call your doctor now or seek immediate medical care if:    · You have symptoms such as:  ? Shortness of breath. ? Feeling very sick. ? Severe pain. ? A fast heart rate. ? Cool, pale, or clammy skin. ? Feeling confused. ?  Feeling very sleepy, or you are hard to wake up.     · You are dizzy or lightheaded, or you feel like you may faint.     · You have a fever or chills. Watch closely for changes in your health, and be sure to contact your doctor if:    · You do not get better as expected. Where can you learn more? Go to http://www.gray.com/  Enter T383 in the search box to learn more about \"Sepsis: Care Instructions. \"  Current as of: February 11, 2020               Content Version: 12.6  © 2006-2020 [employer]. Care instructions adapted under license by [employer] (which disclaims liability or warranty for this information). If you have questions about a medical condition or this instruction, always ask your healthcare professional. [address] any warranty or liability for your use of this information.        ___________________________________________________________________________________________________________________________________

## 2021-02-04 NOTE — PROGRESS NOTES
Progress Note    Patient: Phuong Mcdaniel MRN: [id number]  SSN: [id number]    YOB: 1950  Age: 69 y.o. Sex: male      Admit Date: 2/2/2021    LOS: 2 days     Subjective:   69 y.o. male with medical history significant for HTN, HLD, dementia who presented from memory care unit due to hypoxia. Patient seen and examined at bedside. This morning the patient is awake but unable to obtain too much history due to his dementia. Mostly answering yes or no. Does not look on distress.     Objective:     Vitals:    02/04/21 0320 02/04/21 0709 02/04/21 1010 02/04/21 1200   BP: 132/68 136/86  130/75   Pulse: 72 72  80   Resp: 18 18  18   Temp: 97.9 °F (36.6 °C) 98.6 °F (37 °C)  98.2 °F (36.8 °C)   SpO2: 93% 94% 94% 94%   Weight:       Height:            Intake and Output:  Current Shift: 02/04 0701 - 02/04 1900  In: 700 [P.O.:700]  Out: -   Last three shifts: 02/02 1901 - 02/04 0700  In: 1545 [P.O.:1080; I.V.:300]  Out: -     ROS  Unable to obtain due to patient's dementia    Physical Exam:   General: Alert, NAD  HEENT: NC/AT, EOM are intact  Neck: supple, no JVD  Cardiovascular: RRR, S1, S2, no murmurs  Respiratory: Decreased breath sounds, no wheezes  Abdomen: Soft, NT, ND  Back: No CVA tenderness, no paraspinal tenderness  Extremities: LE without pedal edema, no erythema  Neuro: CN are intact, no focal deficits  Skin: no rash or ulcers    Lab/Data Review:  I have personally reviewed patients laboratory data showing  Recent Results (from the past 24 hour(s))   GLUCOSE, POC    Collection Time: 02/03/21  4:54 PM   Result Value Ref Range    Glucose (POC) 156 (H) 65 - 100 mg/dL   CBC W/O DIFF    Collection Time: 02/04/21  4:43 AM   Result Value Ref Range    WBC 7.9 4.3 - 11.1 K/uL    RBC 4.38 4.23 - 5.6 M/uL    HGB 12.8 (L) 13.6 - 17.2 g/dL    HCT 39.0 (L) 41.1 - 50.3 %    MCV 89.0 79.6 - 97.8 FL    MCH 29.2 26.1 - 32.9 PG    MCHC 32.8 31.4 - 35.0 g/dL    RDW 12.6 11.9 - 14.6 %    PLATELET 003 405 - 960 K/uL    MPV 11.1 9.4 - 12.3 FL    ABSOLUTE NRBC 0.00 0.0 - 0.2 K/uL   METABOLIC PANEL, COMPREHENSIVE    Collection Time: 02/04/21  4:43 AM   Result Value Ref Range    Sodium 147 (H) 136 - 145 mmol/L    Potassium 3.9 3.5 - 5.1 mmol/L    Chloride 116 (H) 98 - 107 mmol/L    CO2 25 21 - 32 mmol/L    Anion gap 6 (L) 7 - 16 mmol/L    Glucose 126 (H) 65 - 100 mg/dL    BUN 27 (H) 8 - 23 MG/DL    Creatinine 1.28 0.8 - 1.5 MG/DL    GFR est AA >60 >60 ml/min/1.73m2    GFR est non-AA 59 (L) >60 ml/min/1.73m2    Calcium 8.0 (L) 8.3 - 10.4 MG/DL    Bilirubin, total 0.5 0.2 - 1.1 MG/DL    ALT (SGPT) 52 12 - 65 U/L    AST (SGOT) 67 (H) 15 - 37 U/L    Alk. phosphatase 70 50 - 136 U/L    Protein, total 6.2 (L) 6.3 - 8.2 g/dL    Albumin 2.6 (L) 3.2 - 4.6 g/dL    Globulin 3.6 (H) 2.3 - 3.5 g/dL    A-G Ratio 0.7 (L) 1.2 - 3.5     GLUCOSE, POC    Collection Time: 02/04/21  6:16 AM   Result Value Ref Range    Glucose (POC) 106 (H) 65 - 100 mg/dL   PLEASE READ & DOCUMENT PPD TEST IN 24 HRS    Collection Time: 02/04/21  2:54 PM   Result Value Ref Range    PPD Negative Negative    mm Induration 0 0 - 5 mm        Image:  I have personally reviewed patients imaging showing  No orders to display        Hospital problems     Principal Problem:    Severe sepsis with acute organ dysfunction ([address] (2/2/2021)    Active Problems:    Lactic acidosis (2/2/2021)      Acute respiratory failure with hypoxia (HCC) (2/2/2021)      Multifocal pneumonia (2/2/2021)      COVID-19 virus infection (2/2/2021)        Assessment and Plan:   69 y.o. male with medical history significant for HTN, HLD, dementia who presented from memory care unit due to hypoxia    1. Acute hypoxic respiratory failure in the setting of COVID-19 pneumonia  -Oxygen therapy to maintain oxygen saturation more than 92%  -Continue Decadron, vitamin C, vitamin D, zinc  -Continue remdesivir  -Transfused convalescent plasma  -Albuterol as needed  -Symptomatic management  -Incentive spirometer    2. Hypertension  -Hold metoprolol, lisinopril, amlodipine since currently normotensive    3. Hyperlipidemia  -Continue statin    4. Dementia  -Monitor mental status  -Avoid sedatives  -Delirium precautions    DVT prophylaxis with Lovenox    I have reviewed, updated, and verified this note's content and spent 38 minutes of my 42 minutes visit performing counseling and coordination of care regarding medical management.       Signed By: Vitaliy Nguyen MD     February 4, 2021

## 2021-02-04 NOTE — PROGRESS NOTES
Phone call attempted. Mr. Gabriela Muro was receiving care from staff.      Alex Sam 67  Board Certified

## 2021-02-05 ENCOUNTER — APPOINTMENT (OUTPATIENT)
Dept: GENERAL RADIOLOGY | Age: 71
DRG: 871 | End: 2021-02-05
Attending: INTERNAL MEDICINE
Payer: COMMERCIAL

## 2021-02-05 LAB
ALBUMIN SERPL-MCNC: 2.5 G/DL (ref 3.2–4.6)
ALBUMIN/GLOB SERPL: 0.8 {RATIO} (ref 1.2–3.5)
ALP SERPL-CCNC: 72 U/L (ref 50–136)
ALT SERPL-CCNC: 44 U/L (ref 12–65)
ANION GAP SERPL CALC-SCNC: 7 MMOL/L (ref 7–16)
AST SERPL-CCNC: 47 U/L (ref 15–37)
BILIRUB SERPL-MCNC: 0.5 MG/DL (ref 0.2–1.1)
BUN SERPL-MCNC: 26 MG/DL (ref 8–23)
CALCIUM SERPL-MCNC: 7.6 MG/DL (ref 8.3–10.4)
CHLORIDE SERPL-SCNC: 111 MMOL/L (ref 98–107)
CO2 SERPL-SCNC: 24 MMOL/L (ref 21–32)
CREAT SERPL-MCNC: 1.14 MG/DL (ref 0.8–1.5)
ERYTHROCYTE [DISTWIDTH] IN BLOOD BY AUTOMATED COUNT: 12.3 % (ref 11.9–14.6)
GLOBULIN SER CALC-MCNC: 3.3 G/DL (ref 2.3–3.5)
GLUCOSE SERPL-MCNC: 118 MG/DL (ref 65–100)
HCT VFR BLD AUTO: 38.5 % (ref 41.1–50.3)
HGB BLD-MCNC: 12.5 G/DL (ref 13.6–17.2)
MCH RBC QN AUTO: 28.4 PG (ref 26.1–32.9)
MCHC RBC AUTO-ENTMCNC: 32.5 G/DL (ref 31.4–35)
MCV RBC AUTO: 87.5 FL (ref 79.6–97.8)
MM INDURATION POC: 0 MM (ref 0–5)
NRBC # BLD: 0 K/UL (ref 0–0.2)
PLATELET # BLD AUTO: 162 K/UL (ref 150–450)
PMV BLD AUTO: 10.4 FL (ref 9.4–12.3)
POTASSIUM SERPL-SCNC: 3.9 MMOL/L (ref 3.5–5.1)
PPD POC: NEGATIVE NEGATIVE
PROT SERPL-MCNC: 5.8 G/DL (ref 6.3–8.2)
RBC # BLD AUTO: 4.4 M/UL (ref 4.23–5.6)
SODIUM SERPL-SCNC: 142 MMOL/L (ref 136–145)
WBC # BLD AUTO: 9.1 K/UL (ref 4.3–11.1)

## 2021-02-05 PROCEDURE — 85027 COMPLETE CBC AUTOMATED: CPT

## 2021-02-05 PROCEDURE — 97535 SELF CARE MNGMENT TRAINING: CPT

## 2021-02-05 PROCEDURE — 74011250637 HC RX REV CODE- 250/637: Performed by: INTERNAL MEDICINE

## 2021-02-05 PROCEDURE — 74011250636 HC RX REV CODE- 250/636: Performed by: INTERNAL MEDICINE

## 2021-02-05 PROCEDURE — 74011000258 HC RX REV CODE- 258: Performed by: INTERNAL MEDICINE

## 2021-02-05 PROCEDURE — 80053 COMPREHEN METABOLIC PANEL: CPT

## 2021-02-05 PROCEDURE — 71045 X-RAY EXAM CHEST 1 VIEW: CPT

## 2021-02-05 PROCEDURE — 74011000250 HC RX REV CODE- 250: Performed by: INTERNAL MEDICINE

## 2021-02-05 PROCEDURE — 65270000029 HC RM PRIVATE

## 2021-02-05 PROCEDURE — 97530 THERAPEUTIC ACTIVITIES: CPT

## 2021-02-05 PROCEDURE — 2709999900 HC NON-CHARGEABLE SUPPLY

## 2021-02-05 RX ORDER — AMLODIPINE BESYLATE 5 MG/1
5 TABLET ORAL DAILY
Status: DISCONTINUED | OUTPATIENT
Start: 2021-02-05 | End: 2021-02-08 | Stop reason: HOSPADM

## 2021-02-05 RX ADMIN — REMDESIVIR 100 MG: 100 INJECTION, POWDER, LYOPHILIZED, FOR SOLUTION INTRAVENOUS at 05:17

## 2021-02-05 RX ADMIN — ROSUVASTATIN CALCIUM 20 MG: 20 TABLET, COATED ORAL at 08:23

## 2021-02-05 RX ADMIN — DEXAMETHASONE SODIUM PHOSPHATE 6 MG: 10 INJECTION INTRAMUSCULAR; INTRAVENOUS at 08:22

## 2021-02-05 RX ADMIN — OXYCODONE HYDROCHLORIDE AND ACETAMINOPHEN 1000 MG: 500 TABLET ORAL at 17:25

## 2021-02-05 RX ADMIN — ASPIRIN 81 MG: 81 TABLET ORAL at 07:35

## 2021-02-05 RX ADMIN — ENOXAPARIN SODIUM 30 MG: 40 INJECTION SUBCUTANEOUS at 07:34

## 2021-02-05 RX ADMIN — Medication 10 ML: at 05:17

## 2021-02-05 RX ADMIN — AMLODIPINE BESYLATE 5 MG: 5 TABLET ORAL at 10:28

## 2021-02-05 RX ADMIN — PANTOPRAZOLE SODIUM 40 MG: 40 TABLET, DELAYED RELEASE ORAL at 07:35

## 2021-02-05 RX ADMIN — OXYCODONE HYDROCHLORIDE AND ACETAMINOPHEN 1000 MG: 500 TABLET ORAL at 08:23

## 2021-02-05 RX ADMIN — GUAIFENESIN 600 MG: 600 TABLET ORAL at 08:23

## 2021-02-05 RX ADMIN — Medication 10 ML: at 20:33

## 2021-02-05 RX ADMIN — Medication 10 ML: at 13:53

## 2021-02-05 RX ADMIN — ZINC SULFATE 220 MG (50 MG) CAPSULE 220 MG: CAPSULE at 08:23

## 2021-02-05 RX ADMIN — ENOXAPARIN SODIUM 30 MG: 40 INJECTION SUBCUTANEOUS at 20:33

## 2021-02-05 RX ADMIN — GUAIFENESIN 600 MG: 600 TABLET ORAL at 20:33

## 2021-02-05 RX ADMIN — CHOLECALCIFEROL TAB 125 MCG (5000 UNIT) 5000 UNITS: 125 TAB at 08:23

## 2021-02-05 NOTE — PROGRESS NOTES
Oxygen Qualifier       Room air: SpO2 with O2 and liter flow   Resting SpO2  90%  90% on 1L   Ambulating SpO2  87% 91% on 3L         Completed by:    Limmie Peabody

## 2021-02-05 NOTE — PROGRESS NOTES
CM spoke with Dr. Porfirio Kinney and from a medical standpoint pt is stable to d/c.  PT is recommending STR upon d/c.  CM called pt's spouse Celos Clark [phone] to discuss STR. She requested that CM begin referrals with [address] [place] [address]. Referrals were made to both facilities. Nilda with [facility] notified CM that they will not be able to accept pt as they do not have any male beds at the present time. HERNANDEZ is waiting to hear from [facility] with [address] [facility]. 1442: CM received notification from [facility] with [facility] that a bed offer has been made. They will begin pre-cert. CM will continue to follow and remain available if any needs arise.

## 2021-02-05 NOTE — PROGRESS NOTES
CM left 2 voice mails for Momo Wilson (admissions director) at [place] [phone]. 1st message left on 2-4-2021 at 1310 and 2nd message left on 2-5-2021 at 1001. CM is calling to inquire about the facilities policy on residents who have tested positive for COVID-19.

## 2021-02-05 NOTE — PROGRESS NOTES
Problem: Falls - Risk of  Goal: *Absence of Falls  Description: Document Barby Porter Fall Risk and appropriate interventions in the flowsheet. Outcome: Progressing Towards Goal  Note: Fall Risk Interventions:  Mobility Interventions: Bed/chair exit alarm, PT Consult for mobility concerns    Mentation Interventions: Bed/chair exit alarm, Reorient patient    Medication Interventions: Bed/chair exit alarm    Elimination Interventions: Bed/chair exit alarm, Call light in reach     Pt pleasantly confused, cooperative with care. Assist/set up with meals and pt able to feed himself today. Incontinence care. Planning rehab bed. No oxygen requirement.

## 2021-02-05 NOTE — PROGRESS NOTES
ACUTE OT GOALS:  (Developed with and agreed upon by patient and/or caregiver.)    1. Patient will complete lower body bathing and dressing with Mod A and adaptive equipment as needed. 2. Patient will complete toileting with Mod A and adaptive equipment as needed. 3. Patient will complete bed mobility with Min A and adaptive equipment as needed. 4. Patient will complete seated ADL for at least 8 minutes with good static and good dynamic seated balance. 5. Patient will complete functional transfers with Min A and adaptive equipment as needed. 6. Patient will complete SPT from bed to chair/BSC with Min A and adaptive equipment as needed.     Timeframe: 7 visits   OCCUPATIONAL THERAPY: Daily Note OT Treatment Day # 2    Colby Contreras is a 69 y.o. male   PRIMARY DIAGNOSIS: Severe sepsis with acute organ dysfunction (HCC)  Acute respiratory failure with hypoxia ([facility] [address] [J96.01]  COVID-19 virus infection [U07.1]  Multifocal pneumonia [J18.9]  Severe sepsis with acute organ dysfunction (HCC) [A41.9, R65.20]  Lactic acidosis [E87.2]       Payor: [facility] / Plan: [place] [facility] [employer] / Product Type: PPO /   ASSESSMENT:     REHAB RECOMMENDATIONS: CURRENT LEVEL OF FUNCTION:  (Most Recently Demonstrated)   Recommendation to date pending progress:  Setting:   Short-term Rehab  Equipment:    To Be Determined Bathing:   Maximal Assistance  Dressing:   Minimal Assistance for gown  Feeding/Grooming:   Minimal Assistance for washing face  Toileting:   Not tested  Functional Mobility:   Not tested     ASSESSMENT:  Mr. Katlyn Braxton continue to require max assist for bed mobility. Pt demonstrates fair sitting balance at edge of bed. Pt was assisted with self care tasks as well. Pt required max assist to complete ADLs today due to mentation. Pt unable to attempt standing due to not being able to follow commands well. Minimal progress noted. Will continue to benefit from skilled OT during stay.      SUBJECTIVE:   Mr. Katlyn Braxton states, \"Yea\"    SOCIAL HISTORY/LIVING ENVIRONMENT:        OBJECTIVE:     PAIN: VITAL SIGNS: LINES/DRAINS:   Pre Treatment: Pain Screen  Pain Scale 1: Numeric (0 - 10)  Pain Intensity 1: 0  Post Treatment:    N/A  O2 Device: Room air     ACTIVITIES OF DAILY LIVING: I Mod I S SBA CGA Min Mod Max Total NT Comments   BASIC ADLs:              Bathing/ Showering [] [] [] [] [] [] [] [x] [] []    Toileting [] [] [] [] [] [] [] [] [] [x]    Dressing [] [] [] [] [] [x] [] [] [] []    Feeding [] [] [] [] [] [] [] [] [] [x]    Grooming [] [] [] [] [] [x] [] [] [] []    Personal Device Care [] [] [] [] [] [] [] [] [] [x]    Functional Mobility [] [] [] [] [] [] [] [] [] [x]    I=Independent, Mod I=Modified Independent, S=Supervision, SBA=Standby Assistance, CGA=Contact Guard Assistance,   Min=Minimal Assistance, Mod=Moderate Assistance, Max=Maximal Assistance, Total=Total Assistance, NT=Not Tested    MOBILITY: I Mod I S SBA CGA Min Mod Max Total  NT x2 Comments:   Supine to sit [] [] [] [] [] [] [] [x] [] [] []    Sit to supine [] [] [] [] [] [] [x] [] [] [] []    Sit to stand [] [] [] [] [] [] [] [] [] [] []    Bed to chair [] [] [] [] [] [] [] [] [] [] []    I=Independent, Mod I=Modified Independent, S=Supervision, SBA=Standby Assistance, CGA=Contact Guard Assistance,   Min=Minimal Assistance, Mod=Moderate Assistance, Max=Maximal Assistance, Total=Total Assistance, NT=Not Tested    PLAN:   FREQUENCY/DURATION: OT Plan of Care: 3 times/week for duration of hospital stay or until stated goals are met, whichever comes first.    TREATMENT:   TREATMENT:   ($$ Self Care/Home Management: 8-22 mins$$ Therapeutic Activity: 8-22 mins   )  Therapeutic Activity (10 Minutes): Therapeutic activity included Supine to Sit, Sit to Supine and Sitting balance  to improve functional Mobility, Strength and Activity tolerance.   Self Care (25 Minutes): Self care including Upper Body Bathing, Lower Body Bathing, Upper Body Dressing, Lower Body Dressing and Grooming to increase independence and decrease level of assistance required.     AFTER TREATMENT POSITION/PRECAUTIONS:  Bed, Needs within reach and RN notified    INTERDISCIPLINARY COLLABORATION:  RN/PCT and OT/VALENTINE    TOTAL TREATMENT DURATION:  OT Patient Time In/Time Out  Time In: 0930  Time Out: ANGEL Schmid

## 2021-02-06 LAB
ALBUMIN SERPL-MCNC: 2.5 G/DL (ref 3.2–4.6)
ALBUMIN/GLOB SERPL: 0.7 {RATIO} (ref 1.2–3.5)
ALP SERPL-CCNC: 82 U/L (ref 50–136)
ALT SERPL-CCNC: 70 U/L (ref 12–65)
ANION GAP SERPL CALC-SCNC: 8 MMOL/L (ref 7–16)
AST SERPL-CCNC: 100 U/L (ref 15–37)
BILIRUB SERPL-MCNC: 0.7 MG/DL (ref 0.2–1.1)
BUN SERPL-MCNC: 28 MG/DL (ref 8–23)
CALCIUM SERPL-MCNC: 7.9 MG/DL (ref 8.3–10.4)
CHLORIDE SERPL-SCNC: 110 MMOL/L (ref 98–107)
CO2 SERPL-SCNC: 24 MMOL/L (ref 21–32)
CREAT SERPL-MCNC: 1.13 MG/DL (ref 0.8–1.5)
ERYTHROCYTE [DISTWIDTH] IN BLOOD BY AUTOMATED COUNT: 11.9 % (ref 11.9–14.6)
GLOBULIN SER CALC-MCNC: 3.6 G/DL (ref 2.3–3.5)
GLUCOSE BLD STRIP.AUTO-MCNC: 133 MG/DL (ref 65–100)
GLUCOSE SERPL-MCNC: 111 MG/DL (ref 65–100)
HCT VFR BLD AUTO: 40.7 % (ref 41.1–50.3)
HGB BLD-MCNC: 13.8 G/DL (ref 13.6–17.2)
MCH RBC QN AUTO: 28.9 PG (ref 26.1–32.9)
MCHC RBC AUTO-ENTMCNC: 33.9 G/DL (ref 31.4–35)
MCV RBC AUTO: 85.3 FL (ref 79.6–97.8)
NRBC # BLD: 0 K/UL (ref 0–0.2)
PLATELET # BLD AUTO: 219 K/UL (ref 150–450)
PMV BLD AUTO: 11.5 FL (ref 9.4–12.3)
POTASSIUM SERPL-SCNC: 4 MMOL/L (ref 3.5–5.1)
PROT SERPL-MCNC: 6.1 G/DL (ref 6.3–8.2)
RBC # BLD AUTO: 4.77 M/UL (ref 4.23–5.6)
SODIUM SERPL-SCNC: 142 MMOL/L (ref 136–145)
WBC # BLD AUTO: 8.6 K/UL (ref 4.3–11.1)

## 2021-02-06 PROCEDURE — 74011250637 HC RX REV CODE- 250/637: Performed by: INTERNAL MEDICINE

## 2021-02-06 PROCEDURE — 85027 COMPLETE CBC AUTOMATED: CPT

## 2021-02-06 PROCEDURE — 74011000250 HC RX REV CODE- 250: Performed by: INTERNAL MEDICINE

## 2021-02-06 PROCEDURE — 74011250636 HC RX REV CODE- 250/636: Performed by: INTERNAL MEDICINE

## 2021-02-06 PROCEDURE — 74011000258 HC RX REV CODE- 258: Performed by: INTERNAL MEDICINE

## 2021-02-06 PROCEDURE — 65270000029 HC RM PRIVATE

## 2021-02-06 PROCEDURE — 80053 COMPREHEN METABOLIC PANEL: CPT

## 2021-02-06 PROCEDURE — 82962 GLUCOSE BLOOD TEST: CPT

## 2021-02-06 RX ORDER — GUAIFENESIN 100 MG/5ML
400 SOLUTION ORAL 3 TIMES DAILY
Status: DISCONTINUED | OUTPATIENT
Start: 2021-02-06 | End: 2021-02-08 | Stop reason: HOSPADM

## 2021-02-06 RX ADMIN — AMLODIPINE BESYLATE 5 MG: 5 TABLET ORAL at 08:23

## 2021-02-06 RX ADMIN — OXYCODONE HYDROCHLORIDE AND ACETAMINOPHEN 1000 MG: 500 TABLET ORAL at 17:06

## 2021-02-06 RX ADMIN — GUAIFENESIN 400 MG: 200 SOLUTION ORAL at 23:49

## 2021-02-06 RX ADMIN — CHOLECALCIFEROL TAB 125 MCG (5000 UNIT) 5000 UNITS: 125 TAB at 08:23

## 2021-02-06 RX ADMIN — Medication 10 ML: at 15:35

## 2021-02-06 RX ADMIN — ASPIRIN 81 MG: 81 TABLET ORAL at 08:23

## 2021-02-06 RX ADMIN — GUAIFENESIN 600 MG: 600 TABLET ORAL at 08:23

## 2021-02-06 RX ADMIN — ZINC SULFATE 220 MG (50 MG) CAPSULE 220 MG: CAPSULE at 08:23

## 2021-02-06 RX ADMIN — Medication 10 ML: at 06:06

## 2021-02-06 RX ADMIN — OXYCODONE HYDROCHLORIDE AND ACETAMINOPHEN 1000 MG: 500 TABLET ORAL at 08:23

## 2021-02-06 RX ADMIN — Medication 10 ML: at 21:33

## 2021-02-06 RX ADMIN — ROSUVASTATIN CALCIUM 20 MG: 20 TABLET, COATED ORAL at 08:23

## 2021-02-06 RX ADMIN — ENOXAPARIN SODIUM 30 MG: 40 INJECTION SUBCUTANEOUS at 08:24

## 2021-02-06 RX ADMIN — ENOXAPARIN SODIUM 30 MG: 40 INJECTION SUBCUTANEOUS at 21:31

## 2021-02-06 RX ADMIN — PANTOPRAZOLE SODIUM 40 MG: 40 TABLET, DELAYED RELEASE ORAL at 06:06

## 2021-02-06 RX ADMIN — REMDESIVIR 100 MG: 100 INJECTION, POWDER, LYOPHILIZED, FOR SOLUTION INTRAVENOUS at 06:05

## 2021-02-06 RX ADMIN — DEXAMETHASONE SODIUM PHOSPHATE 6 MG: 10 INJECTION INTRAMUSCULAR; INTRAVENOUS at 08:24

## 2021-02-06 NOTE — PROGRESS NOTES
Progress Note    Patient: Colby Contreras MRN: [id number]  SSN: [id number]    YOB: 1950  Age: 69 y.o. Sex: male      Admit Date: 2/2/2021    LOS: 4 days     Subjective:   69 y.o. male with medical history significant for HTN, HLD, dementia who presented from memory care unit due to hypoxia. Patient seen and examined at bedside. This morning the patient is awake, but pleasantly confused. Denies any chest pain, no abdominal pain, no nausea. Objective:     Vitals:    02/06/21 0357 02/06/21 0730 02/06/21 1100 02/06/21 1515   BP: 133/81 103/64 (!) 145/89 120/69   Pulse: 61 84 61    Resp: 16 16 18 16   Temp: 98.2 °F (36.8 °C) 97.8 °F (36.6 °C) 97.7 °F (36.5 °C) 98 °F (36.7 °C)   SpO2: 93% 91% 91% 91%   Weight:       Height:            Intake and Output:  Current Shift: No intake/output data recorded.   Last three shifts: 02/04 1901 - 02/06 0700  In: 2280 [P.O.:480; I.V.:1800]  Out: -     ROS  10 ROS negative except for above    Physical Exam:   General: Alert, NAD  HEENT: NC/AT, EOM are intact  Neck: supple, no JVD  Cardiovascular: RRR, S1, S2, no murmurs  Respiratory: Decreased breath sounds, no wheezes  Abdomen: Soft, NT, ND  Back: No CVA tenderness, no paraspinal tenderness  Extremities: LE without pedal edema, no erythema  Neuro: CN are intact, no focal deficits  Skin: no rash or ulcers    Lab/Data Review:  I have personally reviewed patients laboratory data showing  Recent Results (from the past 24 hour(s))   CBC W/O DIFF    Collection Time: 02/06/21  4:45 AM   Result Value Ref Range    WBC 8.6 4.3 - 11.1 K/uL    RBC 4.77 4.23 - 5.6 M/uL    HGB 13.8 13.6 - 17.2 g/dL    HCT 40.7 (L) 41.1 - 50.3 %    MCV 85.3 79.6 - 97.8 FL    MCH 28.9 26.1 - 32.9 PG    MCHC 33.9 31.4 - 35.0 g/dL    RDW 11.9 11.9 - 14.6 %    PLATELET 730 438 - 745 K/uL    MPV 11.5 9.4 - 12.3 FL    ABSOLUTE NRBC 0.00 0.0 - 0.2 K/uL   METABOLIC PANEL, COMPREHENSIVE    Collection Time: 02/06/21  4:45 AM   Result Value Ref Range Sodium 142 136 - 145 mmol/L    Potassium 4.0 3.5 - 5.1 mmol/L    Chloride 110 (H) 98 - 107 mmol/L    CO2 24 21 - 32 mmol/L    Anion gap 8 7 - 16 mmol/L    Glucose 111 (H) 65 - 100 mg/dL    BUN 28 (H) 8 - 23 MG/DL    Creatinine 1.13 0.8 - 1.5 MG/DL    GFR est AA >60 >60 ml/min/1.73m2    GFR est non-AA >60 >60 ml/min/1.73m2    Calcium 7.9 (L) 8.3 - 10.4 MG/DL    Bilirubin, total 0.7 0.2 - 1.1 MG/DL    ALT (SGPT) 70 (H) 12 - 65 U/L    AST (SGOT) 100 (H) 15 - 37 U/L    Alk. phosphatase 82 50 - 136 U/L    Protein, total 6.1 (L) 6.3 - 8.2 g/dL    Albumin 2.5 (L) 3.2 - 4.6 g/dL    Globulin 3.6 (H) 2.3 - 3.5 g/dL    A-G Ratio 0.7 (L) 1.2 - 3.5          Image:  I have personally reviewed patients imaging showing  XR CHEST SNGL V   Final Result      1. Increasing groundglass infiltrative changes throughout right lung, and   persisting at medial left lung base/lingula. 2. Persistent cardiomegaly. CPT code(s) 66833                       Hospital problems     Principal Problem:    Severe sepsis with acute organ dysfunction ([facility] [address] (2/2/2021)    Active Problems:    Lactic acidosis (2/2/2021)      Acute respiratory failure with hypoxia (HCC) (2/2/2021)      Multifocal pneumonia (2/2/2021)      COVID-19 virus infection (2/2/2021)        Assessment and Plan:   69 y.o. male with medical history significant for HTN, HLD, dementia who presented from memory care unit due to hypoxia    1. Acute hypoxic respiratory failure in the setting of COVID-19 pneumonia  -Oxygen therapy to maintain oxygen saturation more than 92%  -Continue Decadron, vitamin C, vitamin D, zinc  -Continue remdesivir  -Transfused convalescent plasma  -Albuterol as needed  -Symptomatic management  -Incentive spirometer    2. Hypertension  -Hold metoprolol, lisinopril, amlodipine since currently normotensive    3. Hyperlipidemia  -Continue statin    4.   Dementia  -Monitor mental status  -Avoid sedatives  -Delirium precautions    DVT prophylaxis with Lovenox    I have reviewed, updated, and verified this note's content and spent 38 minutes of my 42 minutes visit performing counseling and coordination of care regarding medical management.       Signed By: Renee Blackmon MD     February 6, 2021

## 2021-02-06 NOTE — PROGRESS NOTES
Resting quietly, awake, resp even, unlab, skin warm, dry. Assessment completed. Confused. Incontinent of urine and large, soft stool with care given and repositioning. Call light in reach, bed alarm set. No needs, no c/o, no distress.

## 2021-02-07 ENCOUNTER — APPOINTMENT (OUTPATIENT)
Dept: GENERAL RADIOLOGY | Age: 71
DRG: 871 | End: 2021-02-07
Attending: INTERNAL MEDICINE
Payer: COMMERCIAL

## 2021-02-07 LAB
ALBUMIN SERPL-MCNC: 2.4 G/DL (ref 3.2–4.6)
ALBUMIN/GLOB SERPL: 0.7 {RATIO} (ref 1.2–3.5)
ALP SERPL-CCNC: 77 U/L (ref 50–136)
ALT SERPL-CCNC: 115 U/L (ref 12–65)
ANION GAP SERPL CALC-SCNC: 8 MMOL/L (ref 7–16)
AST SERPL-CCNC: 128 U/L (ref 15–37)
BACTERIA SPEC CULT: NORMAL
BACTERIA SPEC CULT: NORMAL
BILIRUB SERPL-MCNC: 0.5 MG/DL (ref 0.2–1.1)
BUN SERPL-MCNC: 25 MG/DL (ref 8–23)
CALCIUM SERPL-MCNC: 8 MG/DL (ref 8.3–10.4)
CHLORIDE SERPL-SCNC: 110 MMOL/L (ref 98–107)
CO2 SERPL-SCNC: 24 MMOL/L (ref 21–32)
CREAT SERPL-MCNC: 1.13 MG/DL (ref 0.8–1.5)
ERYTHROCYTE [DISTWIDTH] IN BLOOD BY AUTOMATED COUNT: 12 % (ref 11.9–14.6)
GLOBULIN SER CALC-MCNC: 3.6 G/DL (ref 2.3–3.5)
GLUCOSE BLD STRIP.AUTO-MCNC: 113 MG/DL (ref 65–100)
GLUCOSE BLD STRIP.AUTO-MCNC: 114 MG/DL (ref 65–100)
GLUCOSE BLD STRIP.AUTO-MCNC: 148 MG/DL (ref 65–100)
GLUCOSE BLD STRIP.AUTO-MCNC: 156 MG/DL (ref 65–100)
GLUCOSE SERPL-MCNC: 116 MG/DL (ref 65–100)
HCT VFR BLD AUTO: 40.7 % (ref 41.1–50.3)
HGB BLD-MCNC: 13.5 G/DL (ref 13.6–17.2)
MCH RBC QN AUTO: 28.5 PG (ref 26.1–32.9)
MCHC RBC AUTO-ENTMCNC: 33.2 G/DL (ref 31.4–35)
MCV RBC AUTO: 85.9 FL (ref 79.6–97.8)
NRBC # BLD: 0.02 K/UL (ref 0–0.2)
PLATELET # BLD AUTO: 277 K/UL (ref 150–450)
PMV BLD AUTO: 10.9 FL (ref 9.4–12.3)
POTASSIUM SERPL-SCNC: 4.1 MMOL/L (ref 3.5–5.1)
PROT SERPL-MCNC: 6 G/DL (ref 6.3–8.2)
RBC # BLD AUTO: 4.74 M/UL (ref 4.23–5.6)
SERVICE CMNT-IMP: NORMAL
SERVICE CMNT-IMP: NORMAL
SODIUM SERPL-SCNC: 142 MMOL/L (ref 138–145)
WBC # BLD AUTO: 10.3 K/UL (ref 4.3–11.1)

## 2021-02-07 PROCEDURE — 74011250636 HC RX REV CODE- 250/636: Performed by: INTERNAL MEDICINE

## 2021-02-07 PROCEDURE — 80053 COMPREHEN METABOLIC PANEL: CPT

## 2021-02-07 PROCEDURE — 77010033678 HC OXYGEN DAILY

## 2021-02-07 PROCEDURE — 74011000258 HC RX REV CODE- 258: Performed by: INTERNAL MEDICINE

## 2021-02-07 PROCEDURE — 94760 N-INVAS EAR/PLS OXIMETRY 1: CPT

## 2021-02-07 PROCEDURE — 71045 X-RAY EXAM CHEST 1 VIEW: CPT

## 2021-02-07 PROCEDURE — 65270000029 HC RM PRIVATE

## 2021-02-07 PROCEDURE — 74011000250 HC RX REV CODE- 250: Performed by: INTERNAL MEDICINE

## 2021-02-07 PROCEDURE — 82962 GLUCOSE BLOOD TEST: CPT

## 2021-02-07 PROCEDURE — 74011250637 HC RX REV CODE- 250/637: Performed by: INTERNAL MEDICINE

## 2021-02-07 PROCEDURE — 2709999900 HC NON-CHARGEABLE SUPPLY

## 2021-02-07 PROCEDURE — 85027 COMPLETE CBC AUTOMATED: CPT

## 2021-02-07 RX ADMIN — REMDESIVIR 100 MG: 100 INJECTION, POWDER, LYOPHILIZED, FOR SOLUTION INTRAVENOUS at 06:00

## 2021-02-07 RX ADMIN — AMLODIPINE BESYLATE 5 MG: 5 TABLET ORAL at 08:25

## 2021-02-07 RX ADMIN — OXYCODONE HYDROCHLORIDE AND ACETAMINOPHEN 1000 MG: 500 TABLET ORAL at 17:26

## 2021-02-07 RX ADMIN — ROSUVASTATIN CALCIUM 20 MG: 20 TABLET, COATED ORAL at 08:27

## 2021-02-07 RX ADMIN — CHOLECALCIFEROL TAB 125 MCG (5000 UNIT) 5000 UNITS: 125 TAB at 08:25

## 2021-02-07 RX ADMIN — GUAIFENESIN 400 MG: 200 SOLUTION ORAL at 08:25

## 2021-02-07 RX ADMIN — ASPIRIN 81 MG: 81 TABLET ORAL at 08:25

## 2021-02-07 RX ADMIN — GUAIFENESIN 400 MG: 200 SOLUTION ORAL at 22:19

## 2021-02-07 RX ADMIN — Medication 10 ML: at 06:00

## 2021-02-07 RX ADMIN — Medication 10 ML: at 22:19

## 2021-02-07 RX ADMIN — OXYCODONE HYDROCHLORIDE AND ACETAMINOPHEN 1000 MG: 500 TABLET ORAL at 08:25

## 2021-02-07 RX ADMIN — DEXAMETHASONE SODIUM PHOSPHATE 6 MG: 10 INJECTION INTRAMUSCULAR; INTRAVENOUS at 08:26

## 2021-02-07 RX ADMIN — ZINC SULFATE 220 MG (50 MG) CAPSULE 220 MG: CAPSULE at 08:25

## 2021-02-07 RX ADMIN — PANTOPRAZOLE SODIUM 40 MG: 40 TABLET, DELAYED RELEASE ORAL at 05:25

## 2021-02-07 RX ADMIN — GUAIFENESIN 400 MG: 200 SOLUTION ORAL at 15:11

## 2021-02-07 RX ADMIN — ENOXAPARIN SODIUM 30 MG: 40 INJECTION SUBCUTANEOUS at 20:35

## 2021-02-07 RX ADMIN — Medication 10 ML: at 15:11

## 2021-02-07 RX ADMIN — ENOXAPARIN SODIUM 30 MG: 40 INJECTION SUBCUTANEOUS at 08:25

## 2021-02-07 NOTE — PROGRESS NOTES
Pt in bed. Pt has fed himself all three meals today. Pt remains oriented to person only. Pt has oxygen in place at 4L nasal cannula. Pt not having any distress and does not appear to be in any pain. Safety measures in place. Preparing to give report to oncoming shift.

## 2021-02-07 NOTE — PROGRESS NOTES
Progress Note    Patient: Bhupendra Cisse MRN: [id number]  SSN: [id number]    YOB: 1950  Age: 69 y.o. Sex: male      Admit Date: 2/2/2021    LOS: 5 days     Subjective:   69 y.o. male with medical history significant for HTN, HLD, dementia who presented from memory care unit due to hypoxia. Patient seen and examined at bedside. Overnight hypoxic, placed on O2. This morning the patient is awake, but pleasantly confused. Denies any chest pain, no abdominal pain, no nausea. Currently on O2 4L NC. Objective:     Vitals:    02/07/21 0617 02/07/21 0700 02/07/21 1130 02/07/21 1200   BP:  131/78  132/80   Pulse: 68 (!) 58  68   Resp:  16  16   Temp:  97.7 °F (36.5 °C)  97.7 °F (36.5 °C)   SpO2: 94% 93% 92% 90%   Weight:       Height:            Intake and Output:  Current Shift: No intake/output data recorded. Last three shifts: No intake/output data recorded.     ROS  10 ROS negative except for above    Physical Exam:   General: Alert, NAD  HEENT: NC/AT, EOM are intact  Neck: supple, no JVD  Cardiovascular: RRR, S1, S2, no murmurs  Respiratory: Decreased breath sounds, no wheezes  Abdomen: Soft, NT, ND  Back: No CVA tenderness, no paraspinal tenderness  Extremities: LE without pedal edema, no erythema  Neuro: CN are intact, no focal deficits  Skin: no rash or ulcers    Lab/Data Review:  I have personally reviewed patients laboratory data showing  Recent Results (from the past 24 hour(s))   GLUCOSE, POC    Collection Time: 02/06/21 11:46 PM   Result Value Ref Range    Glucose (POC) 133 (H) 65 - 100 mg/dL   CBC W/O DIFF    Collection Time: 02/07/21  5:45 AM   Result Value Ref Range    WBC 10.3 4.3 - 11.1 K/uL    RBC 4.74 4.23 - 5.6 M/uL    HGB 13.5 (L) 13.6 - 17.2 g/dL    HCT 40.7 (L) 41.1 - 50.3 %    MCV 85.9 79.6 - 97.8 FL    MCH 28.5 26.1 - 32.9 PG    MCHC 33.2 31.4 - 35.0 g/dL    RDW 12.0 11.9 - 14.6 %    PLATELET 908 941 - 774 K/uL    MPV 10.9 9.4 - 12.3 FL    ABSOLUTE NRBC 0.02 0.0 - 0.2 K/uL METABOLIC PANEL, COMPREHENSIVE    Collection Time: 02/07/21  5:45 AM   Result Value Ref Range    Sodium 142 138 - 145 mmol/L    Potassium 4.1 3.5 - 5.1 mmol/L    Chloride 110 (H) 98 - 107 mmol/L    CO2 24 21 - 32 mmol/L    Anion gap 8 7 - 16 mmol/L    Glucose 116 (H) 65 - 100 mg/dL    BUN 25 (H) 8 - 23 MG/DL    Creatinine 1.13 0.8 - 1.5 MG/DL    GFR est AA >60 >60 ml/min/1.73m2    GFR est non-AA >60 >60 ml/min/1.73m2    Calcium 8.0 (L) 8.3 - 10.4 MG/DL    Bilirubin, total 0.5 0.2 - 1.1 MG/DL    ALT (SGPT) 115 (H) 12 - 65 U/L    AST (SGOT) 128 (H) 15 - 37 U/L    Alk. phosphatase 77 50 - 136 U/L    Protein, total 6.0 (L) 6.3 - 8.2 g/dL    Albumin 2.4 (L) 3.2 - 4.6 g/dL    Globulin 3.6 (H) 2.3 - 3.5 g/dL    A-G Ratio 0.7 (L) 1.2 - 3.5     GLUCOSE, POC    Collection Time: 02/07/21  8:34 AM   Result Value Ref Range    Glucose (POC) 114 (H) 65 - 100 mg/dL   GLUCOSE, POC    Collection Time: 02/07/21 11:57 AM   Result Value Ref Range    Glucose (POC) 113 (H) 65 - 100 mg/dL        Image:  I have personally reviewed patients imaging showing  XR CHEST SNGL V   Final Result   Stable chest x-ray. XR CHEST SNGL V   Final Result      1. Increasing groundglass infiltrative changes throughout right lung, and   persisting at medial left lung base/lingula. 2. Persistent cardiomegaly. CPT code(s) 16590                       Hospital problems     Principal Problem:    Severe sepsis with acute organ dysfunction ([address] (2/2/2021)    Active Problems:    Lactic acidosis (2/2/2021)      Acute respiratory failure with hypoxia (HCC) (2/2/2021)      Multifocal pneumonia (2/2/2021)      COVID-19 virus infection (2/2/2021)        Assessment and Plan:   69 y.o. male with medical history significant for HTN, HLD, dementia who presented from memory care unit due to hypoxia    1.   Acute hypoxic respiratory failure in the setting of COVID-19 pneumonia  -Oxygen therapy to maintain oxygen saturation more than 92%  -Continue Decadron, vitamin C, vitamin D, zinc  -S/p remdesivir  -Transfused convalescent plasma  -Albuterol as needed  -Symptomatic management  -Incentive spirometer    2. Hypertension  -Hold metoprolol, lisinopril, amlodipine since currently normotensive    3. Hyperlipidemia  -Continue statin    4. Dementia  -Monitor mental status  -Avoid sedatives  -Delirium precautions    DVT prophylaxis with Lovenox    I have reviewed, updated, and verified this note's content and spent 38 minutes of my 42 minutes visit performing counseling and coordination of care regarding medical management.       Signed By: Marvin Perry MD     February 7, 2021

## 2021-02-07 NOTE — PROGRESS NOTES
Resting quietly, awake, confused,resp even, unlab, skin warm, dry. Incontinent of urine with care given and repositioning. Follows some simple commands. Assessment completed. Call light in reach. No needs, no c/o. Bed alarm set, head of bed elevated. No distress.

## 2021-02-07 NOTE — PROGRESS NOTES
Resting quietly, awake, resp even, unlab with O2 intact. No c/o, no distress. Call light in reach, bed alarm set. Report given to Misti Acuna RN.

## 2021-02-07 NOTE — PROGRESS NOTES
Messaged Dr. Edgar Villa: During vitals check, room air O2 sat 83%, resp even, unlab, no cough, skin pink, warm, dry. Lung sounds unchanged, clear, diminished in bases. RT in, applied O2 at 3L N/C with O2 sat 88%. No distress noted. How do you want the order for O2 to be entered?

## 2021-02-08 VITALS
HEART RATE: 61 BPM | TEMPERATURE: 98.4 F | BODY MASS INDEX: 27.08 KG/M2 | RESPIRATION RATE: 18 BRPM | WEIGHT: 199.96 LBS | HEIGHT: 72 IN | OXYGEN SATURATION: 92 % | DIASTOLIC BLOOD PRESSURE: 92 MMHG | SYSTOLIC BLOOD PRESSURE: 143 MMHG

## 2021-02-08 LAB
ALBUMIN SERPL-MCNC: 2.4 G/DL (ref 3.2–4.6)
ALBUMIN/GLOB SERPL: 0.7 {RATIO} (ref 1.2–3.5)
ALP SERPL-CCNC: 83 U/L (ref 50–136)
ALT SERPL-CCNC: 95 U/L (ref 12–65)
ANION GAP SERPL CALC-SCNC: 8 MMOL/L (ref 7–16)
AST SERPL-CCNC: 72 U/L (ref 15–37)
BASOPHILS # BLD: 0 K/UL (ref 0–0.2)
BASOPHILS NFR BLD: 0 % (ref 0–2)
BILIRUB SERPL-MCNC: 0.5 MG/DL (ref 0.2–1.1)
BUN SERPL-MCNC: 27 MG/DL (ref 8–23)
CALCIUM SERPL-MCNC: 8.3 MG/DL (ref 8.3–10.4)
CHLORIDE SERPL-SCNC: 109 MMOL/L (ref 98–107)
CO2 SERPL-SCNC: 24 MMOL/L (ref 21–32)
CREAT SERPL-MCNC: 1.07 MG/DL (ref 0.8–1.5)
DIFFERENTIAL METHOD BLD: ABNORMAL
EOSINOPHIL # BLD: 0 K/UL (ref 0–0.8)
EOSINOPHIL NFR BLD: 0 % (ref 0.5–7.8)
ERYTHROCYTE [DISTWIDTH] IN BLOOD BY AUTOMATED COUNT: 12.1 % (ref 11.9–14.6)
GLOBULIN SER CALC-MCNC: 3.4 G/DL (ref 2.3–3.5)
GLUCOSE BLD STRIP.AUTO-MCNC: 97 MG/DL (ref 65–100)
GLUCOSE SERPL-MCNC: 112 MG/DL (ref 65–100)
HCT VFR BLD AUTO: 39.8 % (ref 41.1–50.3)
HGB BLD-MCNC: 13.6 G/DL (ref 13.6–17.2)
IMM GRANULOCYTES # BLD AUTO: 0.7 K/UL (ref 0–0.5)
IMM GRANULOCYTES NFR BLD AUTO: 6 % (ref 0–5)
LYMPHOCYTES # BLD: 1 K/UL (ref 0.5–4.6)
LYMPHOCYTES NFR BLD: 9 % (ref 13–44)
MCH RBC QN AUTO: 28.9 PG (ref 26.1–32.9)
MCHC RBC AUTO-ENTMCNC: 34.2 G/DL (ref 31.4–35)
MCV RBC AUTO: 84.5 FL (ref 79.6–97.8)
MONOCYTES # BLD: 0.9 K/UL (ref 0.1–1.3)
MONOCYTES NFR BLD: 8 % (ref 4–12)
NEUTS SEG # BLD: 9 K/UL (ref 1.7–8.2)
NEUTS SEG NFR BLD: 77 % (ref 43–78)
NRBC # BLD: 0 K/UL (ref 0–0.2)
PLATELET # BLD AUTO: 316 K/UL (ref 150–450)
PMV BLD AUTO: 10.5 FL (ref 9.4–12.3)
POTASSIUM SERPL-SCNC: 4.1 MMOL/L (ref 3.5–5.1)
PROT SERPL-MCNC: 5.8 G/DL (ref 6.3–8.2)
RBC # BLD AUTO: 4.71 M/UL (ref 4.23–5.6)
SODIUM SERPL-SCNC: 141 MMOL/L (ref 138–145)
WBC # BLD AUTO: 11.7 K/UL (ref 4.3–11.1)

## 2021-02-08 PROCEDURE — 74011250637 HC RX REV CODE- 250/637: Performed by: INTERNAL MEDICINE

## 2021-02-08 PROCEDURE — 74011250636 HC RX REV CODE- 250/636: Performed by: INTERNAL MEDICINE

## 2021-02-08 PROCEDURE — 80053 COMPREHEN METABOLIC PANEL: CPT

## 2021-02-08 PROCEDURE — 85025 COMPLETE CBC W/AUTO DIFF WBC: CPT

## 2021-02-08 PROCEDURE — 97530 THERAPEUTIC ACTIVITIES: CPT

## 2021-02-08 PROCEDURE — 82962 GLUCOSE BLOOD TEST: CPT

## 2021-02-08 PROCEDURE — 2709999900 HC NON-CHARGEABLE SUPPLY

## 2021-02-08 RX ORDER — DEXAMETHASONE 6 MG/1
6 TABLET ORAL
Qty: 5 TAB | Refills: 0 | Status: SHIPPED | OUTPATIENT
Start: 2021-02-08 | End: 2021-02-13

## 2021-02-08 RX ORDER — PANTOPRAZOLE SODIUM 40 MG/1
40 TABLET, DELAYED RELEASE ORAL
Qty: 5 TAB | Refills: 0 | Status: SHIPPED | OUTPATIENT
Start: 2021-02-09 | End: 2021-02-14

## 2021-02-08 RX ORDER — VITAMIN E 1000 UNIT
1000 CAPSULE ORAL 2 TIMES DAILY
Qty: 10 TAB | Refills: 0 | Status: SHIPPED | OUTPATIENT
Start: 2021-02-08 | End: 2021-02-13

## 2021-02-08 RX ORDER — ZINC SULFATE 50(220)MG
220 CAPSULE ORAL DAILY
Qty: 5 CAP | Refills: 0 | Status: SHIPPED | OUTPATIENT
Start: 2021-02-09 | End: 2021-02-14

## 2021-02-08 RX ADMIN — ASPIRIN 81 MG: 81 TABLET ORAL at 08:06

## 2021-02-08 RX ADMIN — Medication 10 ML: at 04:25

## 2021-02-08 RX ADMIN — AMLODIPINE BESYLATE 5 MG: 5 TABLET ORAL at 08:06

## 2021-02-08 RX ADMIN — ZINC SULFATE 220 MG (50 MG) CAPSULE 220 MG: CAPSULE at 08:06

## 2021-02-08 RX ADMIN — ENOXAPARIN SODIUM 30 MG: 40 INJECTION SUBCUTANEOUS at 08:06

## 2021-02-08 RX ADMIN — GUAIFENESIN 400 MG: 200 SOLUTION ORAL at 08:05

## 2021-02-08 RX ADMIN — CHOLECALCIFEROL TAB 125 MCG (5000 UNIT) 5000 UNITS: 125 TAB at 08:06

## 2021-02-08 RX ADMIN — DEXAMETHASONE SODIUM PHOSPHATE 6 MG: 10 INJECTION INTRAMUSCULAR; INTRAVENOUS at 08:05

## 2021-02-08 RX ADMIN — GUAIFENESIN 400 MG: 200 SOLUTION ORAL at 15:13

## 2021-02-08 RX ADMIN — ROSUVASTATIN CALCIUM 20 MG: 20 TABLET, COATED ORAL at 08:06

## 2021-02-08 RX ADMIN — OXYCODONE HYDROCHLORIDE AND ACETAMINOPHEN 1000 MG: 500 TABLET ORAL at 08:05

## 2021-02-08 RX ADMIN — Medication 10 ML: at 13:20

## 2021-02-08 RX ADMIN — PANTOPRAZOLE SODIUM 40 MG: 40 TABLET, DELAYED RELEASE ORAL at 08:06

## 2021-02-08 NOTE — PROGRESS NOTES
Pt discharged to [address] today via [facility]. Packet prepared to go with pt to facility. LMSW updated spouse by phone regarding D/C, transport and new room/contact # at facility. Care Management Interventions  PCP Verified by CM: Yes([facility] GET physician and NP)  Mode of Transport at Discharge: BLS([facility])  Transition of Care Consult (CM Consult): Discharge Planning, SNF([facility])  Discharge Durable Medical Equipment: No  Physical Therapy Consult: Yes  Occupational Therapy Consult: Yes  Speech Therapy Consult: No  Current Support Network: Assisted Living, Family Lives Nearby([facility] - Memory Care Unit)  Confirm Follow Up Transport: Other (see comment)([facility])  The Plan for Transition of Care is Related to the Following Treatment Goals : Pt requires STR to return to functional baseline in the community.   The Patient and/or Patient Representative was Provided with a Choice of Provider and Agrees with the Discharge Plan?: Yes  Freedom of Choice List was Provided with Basic Dialogue that Supports the Patient's Individualized Plan of Care/Goals, Treatment Preferences and Shares the Quality Data Associated with the Providers?: Yes  Justice Resource Information Provided?: No  Discharge Location  Discharge Placement: Skilled nursing facility([facility])

## 2021-02-08 NOTE — PROGRESS NOTES
LMSW has received update that pt's insurance has approved pt's precert for SNF rehab at [address]. [facility] Serve message sent to MD with this update. Pt can transfer to SNF when stable.

## 2021-02-08 NOTE — PROGRESS NOTES
Resting quietly, awake, resp even, unlab. O2 intact at 4L N/C. Oriented to person. Able to follow some simple commands. Assessment completed. Incontinent of urine with care given and repositioning. Sips of water taken, with asst. Call light in reach, bed alarm set. No c/o, no distress noted.

## 2021-02-08 NOTE — DISCHARGE SUMMARY
Discharge Summary     Patient: Greg Barnett MRN: [id number]  SSN: [id number]    YOB: 1950  Age: 69 y.o. Sex: male       Admit Date: 2/2/2021    Discharge Date: 2/8/2021      Admission Diagnoses: Acute respiratory failure with hypoxia ([facility] [J96.01]  COVID-19 virus infection [U07.1]  Multifocal pneumonia [J18.9]  Severe sepsis with acute organ dysfunction ([facility] [A41.9, R65.20]  Lactic acidosis [E87.2]    Discharge Diagnoses:   Problem List as of 2/8/2021 Never Reviewed          Codes Class Noted - Resolved    Lactic acidosis ICD-10-CM: E87.2  ICD-9-CM: 276.2  2/2/2021 - Present        Acute respiratory failure with hypoxia ([facility] ICD-10-CM: J96.01  ICD-9-CM: 518.81  2/2/2021 - Present        * (Principal) Severe sepsis with acute organ dysfunction ([facility] ICD-10-CM: A41.9, R65.20  ICD-9-CM: 038.9, 995.92  2/2/2021 - Present        Multifocal pneumonia ICD-10-CM: J18.9  ICD-9-CM: 591  2/2/2021 - Present        COVID-19 virus infection ICD-10-CM: U07.1  ICD-9-CM: 079.89  2/2/2021 - Present               Discharge Condition: Stable    Hospital Course:     Patient with past medical history of    Hypertension   Hyperlipidemia   Dementia      Presented from a memory care unit due to hypoxia.      Tested positive for COVID infection. Hypoxia. CXR shows pneumonia. Patient received  Remdesivir, Decadron, convalescent plasma, vimatin C, D and Zinc.     Patient has been stable on 4-5 LPM of oxygen now. CXR follow up did not show worsening findings. Patient is working with physical therapy and deemed stable to go to a short-term rehabilitation facility. I discussed with wife on the phone today. Physical Exam:      General:                    The patient is a demented elderly male in mild acute respiratory distress when exerting himself. On oxygen cannula. CUQB:                                   [id number].    Eyes:                                   No palpebral pallor or scleral icterus. ENT:                                    External auricular and nasal exam within normal limits.                                             NTWHTO membranes are moist.  Neck:                                   Supple, non-tender, no JVD. Lungs:                       diminished to auscultation bilaterally without wheezes or crackles.                                             No respiratory distress or accessory muscle use. Heart:                                  Regular rate and rhythm, without murmurs, rubs, or gallops. Abdomen:                  Soft, non-tender, non-distended with normoactive bowel sounds. Genitourinary:           No tenderness over the bladder or bilateral CVAs. Extremities:               Without clubbing, cyanosis, or edema. Skin:                                    Normal color, texture, and turgor. No rashes, lesions, or jaundice. Pulses:                      Radial and dorsalis pedis pulses present 2+ bilaterally.                                               Capillary refill <2s. Neurologic:                CN II-XII grossly intact and symmetrical.                                               Moving all four extremities well with no focal deficits.     Consults: None    Significant Diagnostic Studies:     Recent Results (from the past 24 hour(s))   GLUCOSE, POC    Collection Time: 02/07/21  3:57 PM   Result Value Ref Range    Glucose (POC) 156 (H) 65 - 100 mg/dL   GLUCOSE, POC    Collection Time: 02/07/21 11:35 PM   Result Value Ref Range    Glucose (POC) 148 (H) 65 - 992 mg/dL   METABOLIC PANEL, COMPREHENSIVE    Collection Time: 02/08/21  4:25 AM   Result Value Ref Range    Sodium 141 138 - 145 mmol/L    Potassium 4.1 3.5 - 5.1 mmol/L    Chloride 109 (H) 98 - 107 mmol/L    CO2 24 21 - 32 mmol/L    Anion gap 8 7 - 16 mmol/L    Glucose 112 (H) 65 - 100 mg/dL    BUN 27 (H) 8 - 23 MG/DL    Creatinine 1.07 0.8 - 1.5 MG/DL    GFR est AA >60 >60 ml/min/1.73m2    GFR est non-AA >60 >60 ml/min/1.73m2    Calcium 8.3 8.3 - 10.4 MG/DL    Bilirubin, total 0.5 0.2 - 1.1 MG/DL    ALT (SGPT) 95 (H) 12 - 65 U/L    AST (SGOT) 72 (H) 15 - 37 U/L    Alk. phosphatase 83 50 - 136 U/L    Protein, total 5.8 (L) 6.3 - 8.2 g/dL    Albumin 2.4 (L) 3.2 - 4.6 g/dL    Globulin 3.4 2.3 - 3.5 g/dL    A-G Ratio 0.7 (L) 1.2 - 3.5     CBC WITH AUTOMATED DIFF    Collection Time: 02/08/21  4:25 AM   Result Value Ref Range    WBC 11.7 (H) 4.3 - 11.1 K/uL    RBC 4.71 4.23 - 5.6 M/uL    HGB 13.6 13.6 - 17.2 g/dL    HCT 39.8 (L) 41.1 - 50.3 %    MCV 84.5 79.6 - 97.8 FL    MCH 28.9 26.1 - 32.9 PG    MCHC 34.2 31.4 - 35.0 g/dL    RDW 12.1 11.9 - 14.6 %    PLATELET 020 148 - 946 K/uL    MPV 10.5 9.4 - 12.3 FL    ABSOLUTE NRBC 0.00 0.0 - 0.2 K/uL    DF AUTOMATED      NEUTROPHILS 77 43 - 78 %    LYMPHOCYTES 9 (L) 13 - 44 %    MONOCYTES 8 4.0 - 12.0 %    EOSINOPHILS 0 (L) 0.5 - 7.8 %    BASOPHILS 0 0.0 - 2.0 %    IMMATURE GRANULOCYTES 6 (H) 0.0 - 5.0 %    ABS. NEUTROPHILS 9.0 (H) 1.7 - 8.2 K/UL    ABS. LYMPHOCYTES 1.0 0.5 - 4.6 K/UL    ABS. MONOCYTES 0.9 0.1 - 1.3 K/UL    ABS. EOSINOPHILS 0.0 0.0 - 0.8 K/UL    ABS. BASOPHILS 0.0 0.0 - 0.2 K/UL    ABS. IMM. GRANS. 0.7 (H) 0.0 - 0.5 K/UL   GLUCOSE, POC    Collection Time: 02/08/21  8:16 AM   Result Value Ref Range    Glucose (POC) 97 65 - 100 mg/dL     XR chest   2-7-2021  FINDINGS: Cardiac enlargement. Perihilar and lung base infiltrates not  significantly changed from previous exam. No effusion or pneumothorax. No  interval lobar consolidation.  No discrete acute osseous lesion seen.     Results     Procedure Component Value Units Date/Time    CULTURE, BLOOD [[id number]] Collected: 02/02/21 2028    Order Status: Completed Specimen: Blood Updated: 02/07/21 1303     Special Requests: --        RIGHT  Antecubital       Culture result: NO GROWTH 5 DAYS       COVID-19 RAPID TEST [[id number]]  (Abnormal) Collected: 02/02/21 1951    Order Status: Completed Specimen: Nasopharyngeal Updated: 02/02/21 2011     Specimen source Nasopharyngeal        COVID-19 rapid test Detected        Comment:      The specimen is POSITIVE for SARS-CoV-2, the novel coronavirus associated with COVID-19. This test has been authorized by the FDA under an Emergency Use Authorization (EUA) for use by authorized laboratories. Fact sheet for Healthcare Providers: [employer]  Fact sheet for Patients: [employer]       Methodology: Isothermal Nucleic Acid Amplification  RESULTS VERIFIED, PHONED TO AND READ BACK BY  MD Carlene Sapp ON [email or web address] .. [facility] [place]         CULTURE, BLOOD [[id number]] Collected: 02/02/21 1914    Order Status: Completed Specimen: Blood Updated: 02/07/21 1303     Special Requests: --        LEFT  Antecubital       Culture result: NO GROWTH 5 DAYS               Disposition: short-term rehabilitation facility     Discharge Medications:   Current Discharge Medication List      START taking these medications    Details   ascorbic acid, vitamin C, (VITAMIN C) 1,000 mg tablet Take 1 Tab by mouth two (2) times a day for 5 days. Qty: 10 Tab, Refills: 0      pantoprazole (PROTONIX) 40 mg tablet Take 1 Tab by mouth Daily (before breakfast) for 5 days. Qty: 5 Tab, Refills: 0      zinc sulfate (ZINCATE) 220 (50) mg capsule Take 1 Cap by mouth daily for 5 days. Qty: 5 Cap, Refills: 0      dexAMETHasone (DECADRON) 6 mg tablet Take 1 Tab by mouth Daily (before breakfast) for 5 days. Qty: 5 Tab, Refills: 0         CONTINUE these medications which have NOT CHANGED    Details   rosuvastatin (CRESTOR) 20 mg tablet Take 20 mg by mouth daily. amLODIPine (NORVASC) 10 mg tablet Take 10 mg by mouth daily. aspirin delayed-release 81 mg tablet Take 81 mg by mouth Every morning.          STOP taking these medications       lisinopriL (PRINIVIL, ZESTRIL) 20 mg tablet Comments:   Reason for Stopping:         metoprolol tartrate (LOPRESSOR) 50 mg tablet Comments:   Reason for Stopping:               Activity: Activity as tolerated  Diet: Cardiac Diet  Wound Care: Keep wound clean and dry    Follow-up Appointments   Procedures    FOLLOW UP VISIT Appointment in: Other (Specify) See your primary doctor in 3-5 days. See your primary doctor in 3-5 days. Standing Status:   Standing     Number of Occurrences:   1     Order Specific Question:   Appointment in     Answer: Other (Specify)     I have discussed the plan of care with patient and wife. Time spent on discharge is 38 minutes.       Signed By: Jose Enrique Powell MD     February 8, 2021

## 2021-02-08 NOTE — PROGRESS NOTES
Progress Note    Patient: Terrell Acotsa MRN: [id number]  SSN: [id number]    YOB: 1950  Age: 69 y.o. Sex: male      Admit Date: 2/2/2021    LOS: 6 days     Subjective:     Patient with past medical history of    Hypertension   Hyperlipidemia   Dementia     Presented from a memory care unit due to hypoxia. Tested positive for COVID infection. Hypoxia. On 5 LPM now. Afebrile. Objective:     Vitals:    02/07/21 2337 02/08/21 0012 02/08/21 0304 02/08/21 0729   BP: (!) 150/84  135/83 120/79   Pulse: 66  (!) 56 60   Resp: 16  16 18   Temp: 97.8 °F (36.6 °C)  98.4 °F (36.9 °C) 98 °F (36.7 °C)   SpO2: (!) 88% 91% 94% 98%   Weight:       Height:            Intake and Output:  Current Shift: No intake/output data recorded. Last three shifts: No intake/output data recorded. Physical Exam:     General:                    The patient is a demented elderly male in mild acute respiratory distress when exerting himself. Head:                                   Normocephalic/atraumatic. Eyes:                                   No palpebral pallor or scleral icterus. ENT:                                    External auricular and nasal exam within normal limits. Mucous membranes are moist.  Neck:                                   Supple, non-tender, no JVD. Lungs:                       diminished to auscultation bilaterally without wheezes or crackles. No respiratory distress or accessory muscle use. Heart:                                  Regular rate and rhythm, without murmurs, rubs, or gallops. Abdomen:                  Soft, non-tender, non-distended with normoactive bowel sounds. Genitourinary:           No tenderness over the bladder or bilateral CVAs. Extremities:               Without clubbing, cyanosis, or edema. Skin:                                    Normal color, texture, and turgor.  No rashes, lesions, or jaundice. Pulses:                      Radial and dorsalis pedis pulses present 2+ bilaterally. Capillary refill <2s. Neurologic:                CN II-XII grossly intact and symmetrical.                                               Moving all four extremities well with no focal deficits. Lab/Data Review:    Recent Results (from the past 24 hour(s))   GLUCOSE, POC    Collection Time: 02/07/21 11:57 AM   Result Value Ref Range    Glucose (POC) 113 (H) 65 - 100 mg/dL   GLUCOSE, POC    Collection Time: 02/07/21  3:57 PM   Result Value Ref Range    Glucose (POC) 156 (H) 65 - 100 mg/dL   GLUCOSE, POC    Collection Time: 02/07/21 11:35 PM   Result Value Ref Range    Glucose (POC) 148 (H) 65 - 356 mg/dL   METABOLIC PANEL, COMPREHENSIVE    Collection Time: 02/08/21  4:25 AM   Result Value Ref Range    Sodium 141 138 - 145 mmol/L    Potassium 4.1 3.5 - 5.1 mmol/L    Chloride 109 (H) 98 - 107 mmol/L    CO2 24 21 - 32 mmol/L    Anion gap 8 7 - 16 mmol/L    Glucose 112 (H) 65 - 100 mg/dL    BUN 27 (H) 8 - 23 MG/DL    Creatinine 1.07 0.8 - 1.5 MG/DL    GFR est AA >60 >60 ml/min/1.73m2    GFR est non-AA >60 >60 ml/min/1.73m2    Calcium 8.3 8.3 - 10.4 MG/DL    Bilirubin, total 0.5 0.2 - 1.1 MG/DL    ALT (SGPT) 95 (H) 12 - 65 U/L    AST (SGOT) 72 (H) 15 - 37 U/L    Alk.  phosphatase 83 50 - 136 U/L    Protein, total 5.8 (L) 6.3 - 8.2 g/dL    Albumin 2.4 (L) 3.2 - 4.6 g/dL    Globulin 3.4 2.3 - 3.5 g/dL    A-G Ratio 0.7 (L) 1.2 - 3.5     CBC WITH AUTOMATED DIFF    Collection Time: 02/08/21  4:25 AM   Result Value Ref Range    WBC 11.7 (H) 4.3 - 11.1 K/uL    RBC 4.71 4.23 - 5.6 M/uL    HGB 13.6 13.6 - 17.2 g/dL    HCT 39.8 (L) 41.1 - 50.3 %    MCV 84.5 79.6 - 97.8 FL    MCH 28.9 26.1 - 32.9 PG    MCHC 34.2 31.4 - 35.0 g/dL    RDW 12.1 11.9 - 14.6 %    PLATELET 546 687 - 817 K/uL    MPV 10.5 9.4 - 12.3 FL    ABSOLUTE NRBC 0.00 0.0 - 0.2 K/uL    DF AUTOMATED NEUTROPHILS 77 43 - 78 %    LYMPHOCYTES 9 (L) 13 - 44 %    MONOCYTES 8 4.0 - 12.0 %    EOSINOPHILS 0 (L) 0.5 - 7.8 %    BASOPHILS 0 0.0 - 2.0 %    IMMATURE GRANULOCYTES 6 (H) 0.0 - 5.0 %    ABS. NEUTROPHILS 9.0 (H) 1.7 - 8.2 K/UL    ABS. LYMPHOCYTES 1.0 0.5 - 4.6 K/UL    ABS. MONOCYTES 0.9 0.1 - 1.3 K/UL    ABS. EOSINOPHILS 0.0 0.0 - 0.8 K/UL    ABS. BASOPHILS 0.0 0.0 - 0.2 K/UL    ABS. IMM. GRANS. 0.7 (H) 0.0 - 0.5 K/UL   GLUCOSE, POC    Collection Time: 02/08/21  8:16 AM   Result Value Ref Range    Glucose (POC) 97 65 - 100 mg/dL     XR chest   2-7-2021  FINDINGS: Cardiac enlargement. Perihilar and lung base infiltrates not  significantly changed from previous exam. No effusion or pneumothorax. No  interval lobar consolidation.  No discrete acute osseous lesion seen.       Current Facility-Administered Medications:     guaiFENesin (ROBITUSSIN) 100 mg/5 mL oral liquid 400 mg, 400 mg, Oral, TID, Eda Elliott MD, 400 mg at 02/08/21 0805    amLODIPine (NORVASC) tablet 5 mg, 5 mg, Oral, DAILY, Main Abdullahi MD, 5 mg at 02/08/21 0806    enoxaparin (LOVENOX) injection 30 mg, 30 mg, SubCUTAneous, Q12H, Main Abdullahi MD, 30 mg at 02/08/21 8908    rosuvastatin (CRESTOR) tablet 20 mg, 20 mg, Oral, DAILY, Carlos Abdullahi MD, 20 mg at 02/08/21 0806    aspirin delayed-release tablet 81 mg, 81 mg, Oral, QAM, Carlos Abdullahi MD, 81 mg at 02/08/21 0806    sodium chloride (NS) flush 5-40 mL, 5-40 mL, IntraVENous, Q8H, Blane Muñoz MD, 10 mL at 02/08/21 0425    sodium chloride (NS) flush 5-40 mL, 5-40 mL, IntraVENous, PRN, Janay Lee MD    acetaminophen (TYLENOL) tablet 650 mg, 650 mg, Oral, Q6H PRN **OR** acetaminophen (TYLENOL) suppository 650 mg, 650 mg, Rectal, Q6H PRN, Blane Mccray MD    polyethylene glycol (MIRALAX) packet 17 g, 17 g, Oral, DAILY PRN, Janay Lee MD    promethazine (PHENERGAN) tablet 12.5 mg, 12.5 mg, Oral, Q6H PRN **OR** ondansetron (ZOFRAN) injection 4 mg, 4 mg, IntraVENous, Q6H PRN, Brittany Wheeler MD    pantoprazole (PROTONIX) tablet 40 mg, 40 mg, Oral, ACB, Blane Muñoz MD, 40 mg at 02/08/21 0806    dexamethasone (DECADRON) 10 mg/mL injection 6 mg, 6 mg, IntraVENous, DAILY, Blane Muñoz MD, 6 mg at 02/08/21 0805    ascorbic acid (vitamin C) (VITAMIN C) tablet 1,000 mg, 1,000 mg, Oral, BID, Blane Muñoz MD, 1,000 mg at 02/08/21 0805    zinc sulfate (ZINCATE) 220 (50) mg capsule 220 mg, 220 mg, Oral, DAILY, Blane Muñoz MD, 220 mg at 02/08/21 0806    albuterol (PROVENTIL HFA, VENTOLIN HFA, PROAIR HFA) inhaler 1 Puff, 1 Puff, Inhalation, Q4H PRN, Brittany Wheeler MD    cholecalciferol (VITAMIN D3) tablet 5,000 Units, 5,000 Units, Oral, DAILY, Blane Muñoz MD, 5,000 Units at 02/08/21 0806    benzonatate (TESSALON) capsule 100 mg, 100 mg, Oral, TID PRN, Brittany Wheeler MD    0.9% sodium chloride infusion 250 mL, 250 mL, IntraVENous, PRN, Brittany Wheeler MD      Assessment:     Principal Problem:    Severe sepsis with acute organ dysfunction ([address] (2/2/2021)    Active Problems:    Lactic acidosis (2/2/2021)      Acute respiratory failure with hypoxia (HCC) (2/2/2021)      Multifocal pneumonia (2/2/2021)      COVID-19 virus infection (2/2/2021)        Plan:     Acute respiratory failure with hypoxia   Due to COVID infection with pneumonia   S/P Remdesivir   Continue Decadron, vitamin C, vitamin D, zinc  S/P convalescent plasma   Symptomatic treatments   Wean oxygen as tolerated. Hypertension  Monitor blood pressure and manage accordingly. BP is controlled. On Amlodipine. Dyslipidemia  On statin. Dementia   Monitor   Delirium precaution     I have discussed the plan of care with patient.       DVT prophylaxis : Lovenox SC       Signed By: Janessa Palomo MD     February 8, 2021

## 2021-02-08 NOTE — PROGRESS NOTES
Awake with no c/o. Resp even, unlab with O2 intact. Call light in reach, bed alarm set. No distress noted. Report given to Romy Demarco RN.

## 2021-02-08 NOTE — PROGRESS NOTES
ACUTE PHYSICAL THERAPY GOALS:  (Developed with and agreed upon by patient and/or caregiver. )  LTG:  (1.)Mr. Kinsey Wilder will move from supine to sit and sit to supine , scoot up and down and roll side to side in bed with MINIMAL ASSIST within 7 treatment day(s). (2.)Mr. Kinsey Wilder will transfer from bed to chair and chair to bed with MINIMAL ASSIST using the least restrictive device within 7 treatment day(s). (3.)Mr. Kinsey Wilder will ambulate with MINIMAL ASSIST for 50 feet with the least restrictive device within 7 treatment day(s). (4.)Mr. Kinsey Wilder will participate in therapeutic activity/exercises x 25 minutes for increased strength within 7 treatment days. PHYSICAL THERAPY: Daily Note and AM Treatment Day # 3    Antonella Rios is a 69 y.o. male   PRIMARY DIAGNOSIS: Severe sepsis with acute organ dysfunction ([facility]  Acute respiratory failure with hypoxia ([facility] [J96.01]  COVID-19 virus infection [U07.1]  Multifocal pneumonia [J18.9]  Severe sepsis with acute organ dysfunction ([facility] [A41.9, R65.20]  Lactic acidosis [E87.2]         ASSESSMENT:     REHAB RECOMMENDATIONS: CURRENT LEVEL OF FUNCTION:  (Most Recently Demonstrated)   Recommendation to date pending progress:  Setting:   Short-term Rehab  Equipment:    To Be Determined Bed Mobility:   Maximal Assistance  Sit to Stand:   Maximal Assistance  Transfers:   Not tested  Gait/Mobility:   Unable to perform     ASSESSMENT:  Mr. Kinsey Wilder is making slow progress towards PT goals. Patient presents confused with decreased command following. Responds best to manual and tactile cues. Was able to transfer to standing with max assist. Unable to take steps. Will continue efforts. SUBJECTIVE:   Mr. Kinsey Wilder states, \"Super bowl. \"    SOCIAL HISTORY/ LIVING ENVIRONMENT: See eval     OBJECTIVE:     PAIN: VITAL SIGNS: LINES/DRAINS:   Pre Treatment: Pain Screen  Pain Scale 1: FLACC  Pain Intensity 1: 0  Post Treatment: 0   None  O2 Device: Nasal cannula     MOBILITY: I Mod I S SBA CGA Min Mod Max Total  NT x2 Comments:   Bed Mobility    Rolling [] [] [] [] [] [] [] [x] [] [] []    Supine to Sit [] [] [] [] [] [] [] [x] [] [] []    Scooting [] [] [] [] [] [] [] [] [] [] []    Sit to Supine [] [] [] [] [] [] [] [x] [] [] []    Transfers    Sit to Stand [] [] [] [] [] [] [] [x] [] [] []    Bed to Chair [] [] [] [] [] [] [] [] [] [] []    Stand to Sit [] [] [] [] [] [] [] [x] [] [] []    I=Independent, Mod I=Modified Independent, S=Supervision, SBA=Standby Assistance, CGA=Contact Guard Assistance,   Min=Minimal Assistance, Mod=Moderate Assistance, Max=Maximal Assistance, Total=Total Assistance, NT=Not Tested    GAIT: I Mod I S SBA CGA Min Mod Max Total  NT x2 Comments:   Level of Assistance [] [] [] [] [] [] [] [] [] [x] []    Distance Unable    DME Rolling Walker    Gait Quality Unable to perform    Weightbearing  Status N/A     I=Independent, Mod I=Modified Independent, S=Supervision, SBA=Standby Assistance, CGA=Contact Guard Assistance,   Min=Minimal Assistance, Mod=Moderate Assistance, Max=Maximal Assistance, Total=Total Assistance, NT=Not Tested    PLAN:   FREQUENCY/DURATION: PT Plan of Care: 3 times/week for duration of hospital stay or until stated goals are met, whichever comes first.  TREATMENT:     TREATMENT:   ($$ Therapeutic Activity: 23-37 mins    )  Therapeutic Activity (24 Minutes): Therapeutic activity included Rolling, Supine to Sit, Sit to Supine, Scooting, Transfer Training, Sitting balance  and Standing balance to improve functional Mobility, Strength and Activity tolerance.     AFTER TREATMENT POSITION/PRECAUTIONS:  Alarm Activated, Bed, Needs within reach and RN notified    INTERDISCIPLINARY COLLABORATION:  RN/PCT and PT/PTA    TOTAL TREATMENT DURATION:  PT Patient Time In/Time Out  Time In: 0845  Time Out: [address] [facility] [address]

## 2022-02-14 ENCOUNTER — HOSPITAL ENCOUNTER (EMERGENCY)
Age: 72
Discharge: HOME | End: 2022-02-14
Attending: EMERGENCY MEDICINE
Payer: COMMERCIAL

## 2022-02-14 ENCOUNTER — APPOINTMENT (OUTPATIENT)
Dept: CT IMAGING | Age: 72
End: 2022-02-14
Attending: EMERGENCY MEDICINE
Payer: COMMERCIAL

## 2022-02-14 VITALS
HEART RATE: 70 BPM | DIASTOLIC BLOOD PRESSURE: 78 MMHG | SYSTOLIC BLOOD PRESSURE: 142 MMHG | TEMPERATURE: 97.8 F | OXYGEN SATURATION: 99 % | RESPIRATION RATE: 18 BRPM

## 2022-02-14 DIAGNOSIS — S09.90XA CLOSED HEAD INJURY, INITIAL ENCOUNTER: Primary | ICD-10-CM

## 2022-02-14 DIAGNOSIS — S00.81XA ABRASION OF FACE, INITIAL ENCOUNTER: ICD-10-CM

## 2022-02-14 PROCEDURE — 70450 CT HEAD/BRAIN W/O DYE: CPT

## 2022-02-14 PROCEDURE — 99284 EMERGENCY DEPT VISIT MOD MDM: CPT

## 2022-02-14 PROCEDURE — 90471 IMMUNIZATION ADMIN: CPT

## 2022-02-14 PROCEDURE — 74011250636 HC RX REV CODE- 250/636: Performed by: EMERGENCY MEDICINE

## 2022-02-14 PROCEDURE — 72125 CT NECK SPINE W/O DYE: CPT

## 2022-02-14 PROCEDURE — 90715 TDAP VACCINE 7 YRS/> IM: CPT | Performed by: EMERGENCY MEDICINE

## 2022-02-14 RX ORDER — METOPROLOL SUCCINATE 50 MG/1
TABLET, EXTENDED RELEASE ORAL
COMMUNITY

## 2022-02-14 RX ORDER — AMLODIPINE BESYLATE 10 MG/1
10 TABLET ORAL EVERY MORNING
COMMUNITY

## 2022-02-14 RX ORDER — ATORVASTATIN CALCIUM 40 MG/1
TABLET, FILM COATED ORAL
COMMUNITY

## 2022-02-14 RX ADMIN — TETANUS TOXOID, REDUCED DIPHTHERIA TOXOID AND ACELLULAR PERTUSSIS VACCINE, ADSORBED 0.5 ML: 5; 2.5; 8; 8; 2.5 SUSPENSION INTRAMUSCULAR at 00:47

## 2022-02-14 NOTE — ED PROVIDER NOTES
65-year-old male with history of dementia presents from nursing home after a witnessed fall from standing. He struck the right side of his face. Patient at baseline per staff with confusion. Does not take anticoagulants. Unable to get further history from patient. The history is limited by the condition of the patient. Fall         Past Medical History:   Diagnosis Date    Dementia without behavioral disturbance ([facility] [address]     Hyperlipidemia     Hypertension        No past surgical history on file. Family History:   Problem Relation Age of Onset    No Known Problems Mother     No Known Problems Father        Social History     Socioeconomic History    Marital status:      Spouse name: Not on file    Number of children: Not on file    Years of education: Not on file    Highest education level: Not on file   Occupational History    Not on file   Tobacco Use    Smoking status: Never Smoker    Smokeless tobacco: Never Used   Substance and Sexual Activity    Alcohol use: Not Currently    Drug use: Never    Sexual activity: Not on file   Other Topics Concern    Not on file   Social History Narrative    Not on file     Social Determinants of Health     Financial Resource Strain:     Difficulty of Paying Living Expenses: Not on file   Food Insecurity:     Worried About Running Out of Food in the Last Year: Not on file    Shivani of Food in the Last Year: Not on file   Transportation Needs:     Lack of Transportation (Medical): Not on file    Lack of Transportation (Non-Medical):  Not on file   Physical Activity:     Days of Exercise per Week: Not on file    Minutes of Exercise per Session: Not on file   Stress:     Feeling of Stress : Not on file   Social Connections:     Frequency of Communication with Friends and Family: Not on file    Frequency of Social Gatherings with Friends and Family: Not on file    Attends Synagogue Services: Not on file   [employer]s or Organizations: Not on file    Attends Club or Organization Meetings: Not on file    Marital Status: Not on file   Intimate Partner Violence:     Fear of Current or Ex-Partner: Not on file    Emotionally Abused: Not on file    Physically Abused: Not on file    Sexually Abused: Not on file   Housing Stability:     Unable to Pay for Housing in the Last Year: Not on file    Number of [place] in the Last Year: Not on file    Unstable Housing in the Last Year: Not on file         ALLERGIES: Patient has no known allergies. Review of Systems   Unable to perform ROS: Dementia   HENT: Positive for facial swelling. Skin: Positive for wound. Vitals:    02/14/22 0032   BP: (!) 149/84   Pulse: 70   Resp: 18   Temp: 97.8 °F (36.6 °C)   SpO2: 96%            Physical Exam  Vitals and nursing note reviewed. HENT:      Head: Normocephalic. Contusion present. Nose: Signs of injury present. Eyes:      Pupils: Pupils are equal, round, and reactive to light. Cardiovascular:      Rate and Rhythm: Normal rate. Pulmonary:      Effort: Pulmonary effort is normal.   Abdominal:      General: Abdomen is flat. Musculoskeletal:         General: No swelling, tenderness or deformity. Cervical back: Normal range of motion and neck supple. No tenderness. Skin:     General: Skin is dry. Neurological:      General: No focal deficit present. Mental Status: He is alert. Mental status is at baseline. He is disoriented. Psychiatric:         Attention and Perception: He is inattentive. Behavior: Behavior is withdrawn. MDM  Number of Diagnoses or Management Options  Diagnosis management comments: Parts of this document were created using dragon voice recognition software. The chart has been reviewed but errors may still be present. I wore appropriate PPE throughout this patient's ED visit.  Alejandra Carcamo MD, 12:45 AM    No ICH     I discussed the results of all labs, procedures, radiographs, and treatments with the patient and available family. Treatment plan is agreed upon and the patient is ready for discharge. Questions about treatment in the ED and differential diagnosis of presenting condition were answered. Patient was given verbal discharge instructions including, but not limited to, importance of returning to the emergency department for any concern of worsening or continued symptoms. Instructions were given to follow up with a primary care provider or specialist within 1-2 days. Adverse effects of medications, if prescribed, were discussed and patient was advised to refrain from significant physical activity until followed up by primary care physician and to not drive or operate heavy machinery after taking any sedating substances. Amount and/or Complexity of Data Reviewed  Tests in the radiology section of CPT®: ordered and reviewed (CT HEAD WO CONT    Result Date: 2/14/2022  EXAM: CT HEAD WO CONT HISTORY:  Fall, dementia. TECHNIQUE: Axial images of the brain were performed without the administration of intravenous contrast. Images were obtained axial plane and coronal reformatted images were submitted. Dose reduction technique used: Automated exposure control/Adjustment of the mA and/or kV according to patient size/Use of iterative reconstruction technique. COMPARISON: None. FINDINGS: There is no evidence of acute intracranial hemorrhage, midline shift, or mass effect. No intra or extra-axial fluid collections are observed. There are mild chronic appearing microangiopathic changes within the periventricular white matter. No evidence of acute confluent territorial infarction. Ventricles and basal cisterns are patent and symmetric. There is mild generalized volume loss. Visualized paranasal sinuses and mastoid air cells are without significant fluid. Scalp, soft tissues, and calvarium are within normal limits. 1. No CT evidence of acute intracranial process. EXAMINATION: CT SPINE CERV WO CONT HISTORY: fall, hit head. TECHNIQUE: Noncontrast CT of cervical spine was performed in the axial plane. Coronal and sagittal reformatted images were created and reviewed. Dose reduction technique used: Automated exposure control/Adjustment of the mA and/or kV according to patient size/Use of iterative reconstruction technique. COMPARISON: None. FINDINGS: No evidence of acute fracture or traumatic subluxation. There is normal cervical lordosis. Vertebral body heights and intervertebral disc spaces are maintained. The occipital condyles and visualized skull base is unremarkable. Multilevel degenerative changes are present throughout the cervical spine. No high-grade spinal canal or neural foraminal stenosis is identified. There is no abnormal prevertebral soft tissue edema. No fluid collections are identified within the paraspinal soft tissues. IMPRESSION: No CT evidence of an acute fracture or traumatic subluxation. CT SPINE CERV WO CONT    Result Date: 2/14/2022  EXAM: CT HEAD WO CONT HISTORY:  Fall, dementia. TECHNIQUE: Axial images of the brain were performed without the administration of intravenous contrast. Images were obtained axial plane and coronal reformatted images were submitted. Dose reduction technique used: Automated exposure control/Adjustment of the mA and/or kV according to patient size/Use of iterative reconstruction technique. COMPARISON: None. FINDINGS: There is no evidence of acute intracranial hemorrhage, midline shift, or mass effect. No intra or extra-axial fluid collections are observed. There are mild chronic appearing microangiopathic changes within the periventricular white matter. No evidence of acute confluent territorial infarction. Ventricles and basal cisterns are patent and symmetric. There is mild generalized volume loss. Visualized paranasal sinuses and mastoid air cells are without significant fluid.  Scalp, soft tissues, and calvarium are within normal limits. 1. No CT evidence of acute intracranial process. EXAMINATION: CT SPINE CERV WO CONT HISTORY: fall, hit head. TECHNIQUE: Noncontrast CT of cervical spine was performed in the axial plane. Coronal and sagittal reformatted images were created and reviewed. Dose reduction technique used: Automated exposure control/Adjustment of the mA and/or kV according to patient size/Use of iterative reconstruction technique. COMPARISON: None. FINDINGS: No evidence of acute fracture or traumatic subluxation. There is normal cervical lordosis. Vertebral body heights and intervertebral disc spaces are maintained. The occipital condyles and visualized skull base is unremarkable. Multilevel degenerative changes are present throughout the cervical spine. No high-grade spinal canal or neural foraminal stenosis is identified. There is no abnormal prevertebral soft tissue edema. No fluid collections are identified within the paraspinal soft tissues.  IMPRESSION: No CT evidence of an acute fracture or traumatic subluxation.     )  Tests in the medicine section of CPT®: ordered and reviewed           Procedures

## 2022-02-14 NOTE — ED NOTES
I have reviewed discharge instructions with the patient. The patient verbalized understanding. Patient left ED via Discharge Method: stretcher to SNF with [facility]. Opportunity for questions and clarification provided. Patient given 0 scripts. To continue your aftercare when you leave the hospital, you may receive an automated call from our care team to check in on how you are doing. This is a free service and part of our promise to provide the best care and service to meet your aftercare needs.  If you have questions, or wish to unsubscribe from this service please call [phone]. Thank you for Choosing our [address] [place] [address] Emergency Department.

## 2022-02-14 NOTE — ED TRIAGE NOTES
Fall from standing. Abrasion to right side of nose. Pt denies LOC but is confused at baseline. No blood thinners. Pt at baseline per nursing staff.

## 2023-03-06 NOTE — PROGRESS NOTES
Pt in bed. Pt alert to person only, reoriented to time,place and situation. Pt has o2 nasal cannula at 5L. Pt does not appear to be in any pain or distress. Safety measures activated. n/a